# Patient Record
Sex: MALE | Race: WHITE | NOT HISPANIC OR LATINO | Employment: OTHER | URBAN - METROPOLITAN AREA
[De-identification: names, ages, dates, MRNs, and addresses within clinical notes are randomized per-mention and may not be internally consistent; named-entity substitution may affect disease eponyms.]

---

## 2017-06-30 ENCOUNTER — OFFICE VISIT (OUTPATIENT)
Dept: FAMILY MEDICINE | Facility: CLINIC | Age: 42
End: 2017-06-30
Payer: COMMERCIAL

## 2017-06-30 VITALS
HEART RATE: 89 BPM | WEIGHT: 191.4 LBS | BODY MASS INDEX: 27.4 KG/M2 | OXYGEN SATURATION: 99 % | SYSTOLIC BLOOD PRESSURE: 133 MMHG | HEIGHT: 70 IN | DIASTOLIC BLOOD PRESSURE: 78 MMHG

## 2017-06-30 DIAGNOSIS — Z11.3 SCREEN FOR STD (SEXUALLY TRANSMITTED DISEASE): ICD-10-CM

## 2017-06-30 DIAGNOSIS — B35.1 NAIL FUNGUS: Primary | ICD-10-CM

## 2017-06-30 LAB
ALBUMIN SERPL-MCNC: 4.1 G/DL (ref 3.4–5)
ALP SERPL-CCNC: 97 U/L (ref 40–150)
ALT SERPL W P-5'-P-CCNC: 49 U/L (ref 0–70)
ANION GAP SERPL CALCULATED.3IONS-SCNC: 6 MMOL/L (ref 3–14)
AST SERPL W P-5'-P-CCNC: 21 U/L (ref 0–45)
BILIRUB SERPL-MCNC: 0.5 MG/DL (ref 0.2–1.3)
BUN SERPL-MCNC: 19 MG/DL (ref 7–30)
CALCIUM SERPL-MCNC: 9.2 MG/DL (ref 8.5–10.1)
CHLORIDE SERPL-SCNC: 105 MMOL/L (ref 94–109)
CO2 SERPL-SCNC: 30 MMOL/L (ref 20–32)
CREAT SERPL-MCNC: 1.13 MG/DL (ref 0.66–1.25)
GFR SERPL CREATININE-BSD FRML MDRD: 71 ML/MIN/1.7M2
GLUCOSE SERPL-MCNC: 78 MG/DL (ref 70–99)
HCV AB SERPL QL IA: NORMAL
HIV 1+2 AB+HIV1 P24 AG SERPL QL IA: NORMAL
POTASSIUM SERPL-SCNC: 4.5 MMOL/L (ref 3.4–5.3)
PROT SERPL-MCNC: 7.7 G/DL (ref 6.8–8.8)
SODIUM SERPL-SCNC: 141 MMOL/L (ref 133–144)

## 2017-06-30 PROCEDURE — 86780 TREPONEMA PALLIDUM: CPT | Performed by: PHYSICIAN ASSISTANT

## 2017-06-30 PROCEDURE — 87591 N.GONORRHOEAE DNA AMP PROB: CPT | Performed by: PHYSICIAN ASSISTANT

## 2017-06-30 PROCEDURE — 36415 COLL VENOUS BLD VENIPUNCTURE: CPT | Performed by: PHYSICIAN ASSISTANT

## 2017-06-30 PROCEDURE — 80053 COMPREHEN METABOLIC PANEL: CPT | Performed by: PHYSICIAN ASSISTANT

## 2017-06-30 PROCEDURE — 87389 HIV-1 AG W/HIV-1&-2 AB AG IA: CPT | Performed by: PHYSICIAN ASSISTANT

## 2017-06-30 PROCEDURE — 87491 CHLMYD TRACH DNA AMP PROBE: CPT | Performed by: PHYSICIAN ASSISTANT

## 2017-06-30 PROCEDURE — 99213 OFFICE O/P EST LOW 20 MIN: CPT | Performed by: PHYSICIAN ASSISTANT

## 2017-06-30 PROCEDURE — 86803 HEPATITIS C AB TEST: CPT | Performed by: PHYSICIAN ASSISTANT

## 2017-06-30 RX ORDER — FLUCONAZOLE 200 MG/1
200 TABLET ORAL
Qty: 30 TABLET | Refills: 1 | Status: SHIPPED | OUTPATIENT
Start: 2017-07-03 | End: 2018-11-29

## 2017-06-30 NOTE — PROGRESS NOTES
"SUBJECTIVE:                                                    Bill Wadsworth is a 42 year old male who presents to clinic today for the following health issues:    Toenail problem       Duration: 6 months    Description (location/character/radiation): left great toe - fungus under nail    Intensity:  mild    Accompanying signs and symptoms:     History (similar episodes/previous evaluation): had similar thing in the past     Precipitating or alleviating factors: None    Therapies tried and outcome: was prescribed Fluconazole in the past which worked     STD screening - no known exposure, no current sx's - 50+ female partners. History of chlamydia age 19.    Problem list and histories reviewed & adjusted, as indicated.  Additional history: as documented    Patient Active Problem List   Diagnosis     CARDIOVASCULAR SCREENING; LDL GOAL LESS THAN 160     Impingement syndrome of right shoulder     Pectoralis muscle strain     Fear of flying     Past Surgical History:   Procedure Laterality Date     APPENDECTOMY OPEN  2004     surgery      cheek bone surgery       Social History   Substance Use Topics     Smoking status: Never Smoker     Smokeless tobacco: Never Used     Alcohol use Yes      Comment: 4-5 drinks per wk     Family History   Problem Relation Age of Onset     Hypertension Mother      CANCER Paternal Grandmother      Cancer - colorectal Paternal Grandfather          Current Outpatient Prescriptions   Medication Sig Dispense Refill     [START ON 7/3/2017] fluconazole (DIFLUCAN) 200 MG tablet Take 1 tablet (200 mg) by mouth twice a week 30 tablet 1     No Known Allergies    ROS:  Constitutional, HEENT, cardiovascular, pulmonary, gi and gu systems are negative, except as otherwise noted.    OBJECTIVE:     /78  Pulse 89  Ht 5' 10\" (1.778 m)  Wt 191 lb 6.4 oz (86.8 kg)  SpO2 99%  BMI 27.46 kg/m2  Body mass index is 27.46 kg/(m^2).  GENERAL: healthy, alert and no distress  Left great toe nail is " thickened and yellow.     Diagnostic Test Results:  No results found for this or any previous visit (from the past 24 hour(s)).    ASSESSMENT/PLAN:         ICD-10-CM    1. Nail fungus B35.1 fluconazole (DIFLUCAN) 200 MG tablet     Comprehensive metabolic panel     Comprehensive metabolic panel   2. Screen for STD (sexually transmitted disease) Z11.3 Anti Treponema     Chlamydia trachomatis PCR     Neisseria gonorrhoeae PCR     HIV Antigen Antibody Combo     Hepatitis C antibody   labs pending  Start diflucan. warning signs discussed. side effects discussed  Recheck labs in 4 wks.   Follow up  As needed .    Arun Dasilva PA-C  [facility]

## 2017-06-30 NOTE — NURSING NOTE
"Chief Complaint   Patient presents with     Fungal Infection     STD     screening       Initial /78  Pulse 89  Ht 5' 10\" (1.778 m)  Wt 191 lb 6.4 oz (86.8 kg)  SpO2 99%  BMI 27.46 kg/m2 Estimated body mass index is 27.46 kg/(m^2) as calculated from the following:    Height as of this encounter: 5' 10\" (1.778 m).    Weight as of this encounter: 191 lb 6.4 oz (86.8 kg).  Medication Reconciliation: complete  Rachelle Weir CMA    "

## 2017-06-30 NOTE — MR AVS SNAPSHOT
After Visit Summary   6/30/2017    Bill Wadsworth    MRN: [id number]           Patient Information     Date Of Birth          1975        Visit Information        Provider Department      6/30/2017 9:40 AM Arun Dasilva PA-C [facility]        Today's Diagnoses     Nail fungus    -  1    Screen for STD (sexually transmitted disease)           Follow-ups after your visit        Future tests that were ordered for you today     Open Future Orders        Priority Expected Expires Ordered    Comprehensive metabolic panel Routine  1/1/2018 6/30/2017            Who to contact     If you have questions or need follow up information about today's clinic visit or your schedule please contact [facility] directly at [phone].  Normal or non-critical lab and imaging results will be communicated to you by Advanced Photonixhart, letter or phone within 4 business days after the clinic has received the results. If you do not hear from us within 7 days, please contact the clinic through Advanced Photonixhart or phone. If you have a critical or abnormal lab result, we will notify you by phone as soon as possible.  Submit refill requests through [employer] or call your pharmacy and they will forward the refill request to us. Please allow 3 business days for your refill to be completed.          Additional Information About Your Visit        MyChart Information     [employer] gives you secure access to your electronic health record. If you see a primary care provider, you can also send messages to your care team and make appointments. If you have questions, please call your primary care clinic.  If you do not have a primary care provider, please call [phone] and they will assist you.        Care EveryWhere ID     This is your Care EveryWhere ID. This could be used by other organizations to access your [place] medical records  [id number]        Your Vitals Were     Pulse Height Pulse Oximetry BMI (Body  "Mass Index)          89 5' 10\" (1.778 m) 99% 27.46 kg/m2         Blood Pressure from Last 3 Encounters:   06/30/17 133/78   09/29/15 130/87   09/29/15 130/87    Weight from Last 3 Encounters:   06/30/17 191 lb 6.4 oz (86.8 kg)   09/29/15 196 lb (88.9 kg)   09/29/15 196 lb (88.9 kg)              We Performed the Following     Anti Treponema     Chlamydia trachomatis PCR     Comprehensive metabolic panel     Hepatitis C antibody     HIV Antigen Antibody Combo     Neisseria gonorrhoeae PCR          Today's Medication Changes          These changes are accurate as of: 6/30/17  9:54 AM.  If you have any questions, ask your nurse or doctor.               Start taking these medicines.        Dose/Directions    fluconazole 200 MG tablet   Commonly known as:  DIFLUCAN   Used for:  Nail fungus   Started by:  Arun Dasilva PA-C        Dose:  200 mg   Start taking on:  7/3/2017   Take 1 tablet (200 mg) by mouth twice a week   Quantity:  30 tablet   Refills:  1            Where to get your medicines      These medications were sent to [address], [facility] [address]  [address] [address] [address]     Phone:  [phone]     fluconazole 200 MG tablet                Primary Care Provider Office Phone # Fax #    William Rosenberg [phone]       [facility] [address] [facility] [address]        Equal Access to Services     HILDA CASAS AH: Hadii hugo ku hadasho Soomaali, waaxda luqadaha, qaybta kaalmada adeegyada, waxay abril fowler. So [facility] [phone].    ATENCIÓN: Si habla español, tiene a humphries disposición servicios gratuitos de asistencia lingüística. Llame al [phone].    We comply with applicable federal civil rights laws and [place] laws. We do not discriminate on the basis of race, color, national origin, age, disability sex, sexual orientation or gender identity.            Thank you!     Thank " you for choosing [facility]  [facility]. Our goal is always to provide you with excellent care. Hearing back from our patients is one way we can continue to improve our services. Please take a few minutes to complete the written survey that you may receive in the mail after your visit with us. Thank you!             Your Updated Medication List - Protect others around you: Learn how to safely use, store and throw away your medicines at www.disposemymeds.org.          This list is accurate as of: 6/30/17  9:54 AM.  Always use your most recent med list.                   Brand Name Dispense Instructions for use Diagnosis    fluconazole 200 MG tablet   Start taking on:  7/3/2017    DIFLUCAN    30 tablet    Take 1 tablet (200 mg) by mouth twice a week    Nail fungus

## 2017-07-02 LAB
C TRACH DNA SPEC QL NAA+PROBE: NORMAL
N GONORRHOEA DNA SPEC QL NAA+PROBE: NORMAL
SPECIMEN SOURCE: NORMAL
SPECIMEN SOURCE: NORMAL
T PALLIDUM IGG+IGM SER QL: NEGATIVE

## 2017-07-25 DIAGNOSIS — B35.1 NAIL FUNGUS: ICD-10-CM

## 2017-07-25 LAB
ALBUMIN SERPL-MCNC: 4.2 G/DL (ref 3.4–5)
ALP SERPL-CCNC: 91 U/L (ref 40–150)
ALT SERPL W P-5'-P-CCNC: 44 U/L (ref 0–70)
ANION GAP SERPL CALCULATED.3IONS-SCNC: 6 MMOL/L (ref 3–14)
AST SERPL W P-5'-P-CCNC: 23 U/L (ref 0–45)
BILIRUB SERPL-MCNC: 0.4 MG/DL (ref 0.2–1.3)
BUN SERPL-MCNC: 16 MG/DL (ref 7–30)
CALCIUM SERPL-MCNC: 9.2 MG/DL (ref 8.5–10.1)
CHLORIDE SERPL-SCNC: 104 MMOL/L (ref 94–109)
CO2 SERPL-SCNC: 29 MMOL/L (ref 20–32)
CREAT SERPL-MCNC: 1.05 MG/DL (ref 0.66–1.25)
GFR SERPL CREATININE-BSD FRML MDRD: 78 ML/MIN/1.7M2
GLUCOSE SERPL-MCNC: 103 MG/DL (ref 70–99)
POTASSIUM SERPL-SCNC: 4.3 MMOL/L (ref 3.4–5.3)
PROT SERPL-MCNC: 8.1 G/DL (ref 6.8–8.8)
SODIUM SERPL-SCNC: 139 MMOL/L (ref 133–144)

## 2017-07-25 PROCEDURE — 36415 COLL VENOUS BLD VENIPUNCTURE: CPT | Performed by: PHYSICIAN ASSISTANT

## 2017-07-25 PROCEDURE — 80053 COMPREHEN METABOLIC PANEL: CPT | Performed by: PHYSICIAN ASSISTANT

## 2017-07-26 NOTE — PROGRESS NOTES
Mr. Wadsworth,    All of your labs were normal/  near normal for you.    Please contact the clinic if you have additional questions.  Thank you.    Sincerely,    Arun Dasilva PA-C

## 2017-11-02 ENCOUNTER — ALLIED HEALTH/NURSE VISIT (OUTPATIENT)
Dept: NURSING | Facility: CLINIC | Age: 42
End: 2017-11-02
Payer: COMMERCIAL

## 2017-11-02 DIAGNOSIS — Z23 NEED FOR PROPHYLACTIC VACCINATION AND INOCULATION AGAINST INFLUENZA: Primary | ICD-10-CM

## 2017-11-02 PROCEDURE — 90686 IIV4 VACC NO PRSV 0.5 ML IM: CPT

## 2017-11-02 PROCEDURE — 99207 ZZC NO CHARGE NURSE ONLY: CPT

## 2017-11-02 PROCEDURE — 90471 IMMUNIZATION ADMIN: CPT

## 2017-11-02 NOTE — MR AVS SNAPSHOT
After Visit Summary   11/2/2017    Bill Wadsworth    MRN: [id number]           Patient Information     Date Of Birth          1975        Visit Information        Provider Department      11/2/2017 2:00 PM SENA WILSON [facility]        Today's Diagnoses     Need for prophylactic vaccination and inoculation against influenza    -  1       Follow-ups after your visit        Who to contact     If you have questions or need follow up information about today's clinic visit or your schedule please contact [facility] directly at [phone].  Normal or non-critical lab and imaging results will be communicated to you by Turbulenzhart, letter or phone within 4 business days after the clinic has received the results. If you do not hear from us within 7 days, please contact the clinic through ClariPhy Communicationst or phone. If you have a critical or abnormal lab result, we will notify you by phone as soon as possible.  Submit refill requests through [employer] or call your pharmacy and they will forward the refill request to us. Please allow 3 business days for your refill to be completed.          Additional Information About Your Visit        MyChart Information     [employer] gives you secure access to your electronic health record. If you see a primary care provider, you can also send messages to your care team and make appointments. If you have questions, please call your primary care clinic.  If you do not have a primary care provider, please call [phone] and they will assist you.        Care EveryWhere ID     This is your Care EveryWhere ID. This could be used by other organizations to access your [place] medical records  [id number]         Blood Pressure from Last 3 Encounters:   06/30/17 133/78   09/29/15 130/87   09/29/15 130/87    Weight from Last 3 Encounters:   06/30/17 191 lb 6.4 oz (86.8 kg)   09/29/15 196 lb (88.9 kg)   09/29/15 196 lb (88.9 kg)              We Performed the  Following     FLU VAC, SPLIT VIRUS IM > 3 YO (QUADRIVALENT) [04286]     Vaccine Administration, Initial [[id number]]        Primary Care Provider Office Phone # Fax #    William Rosenberg [phone] [address] [facility] [address]        Equal Access to Services     THERON CASAS : Hadii aad ku hadasho Soomaali, waaxda luqadaha, qaybta kaalmada adeegyada, waxay sinaiin hayaan aderodney soloriofloresfabio samuel . So [facility] [phone].    ATENCIÓN: Si habla español, tiene a humphries disposición servicios gratuitos de asistencia lingüística. Llame al [phone].    We comply with applicable federal civil rights laws and [place] laws. We do not discriminate on the basis of race, color, national origin, age, disability, sex, sexual orientation, or gender identity.            Thank you!     Thank you for choosing [facility]  for your care. Our goal is always to provide you with excellent care. Hearing back from our patients is one way we can continue to improve our services. Please take a few minutes to complete the written survey that you may receive in the mail after your visit with us. Thank you!             Your Updated Medication List - Protect others around you: Learn how to safely use, store and throw away your medicines at www.disposemymeds.org.          This list is accurate as of: 11/2/17  3:34 PM.  Always use your most recent med list.                   Brand Name Dispense Instructions for use Diagnosis    fluconazole 200 MG tablet    DIFLUCAN    30 tablet    Take 1 tablet (200 mg) by mouth twice a week    Nail fungus

## 2017-11-02 NOTE — PROGRESS NOTES

## 2018-11-29 ENCOUNTER — OFFICE VISIT (OUTPATIENT)
Dept: FAMILY MEDICINE | Facility: CLINIC | Age: 43
End: 2018-11-29
Payer: COMMERCIAL

## 2018-11-29 VITALS
OXYGEN SATURATION: 97 % | HEART RATE: 97 BPM | TEMPERATURE: 97.3 F | SYSTOLIC BLOOD PRESSURE: 138 MMHG | WEIGHT: 197 LBS | RESPIRATION RATE: 16 BRPM | BODY MASS INDEX: 28.27 KG/M2 | DIASTOLIC BLOOD PRESSURE: 84 MMHG

## 2018-11-29 DIAGNOSIS — I49.9 IRREGULAR HEART BEAT: Primary | ICD-10-CM

## 2018-11-29 DIAGNOSIS — Z23 NEED FOR PROPHYLACTIC VACCINATION AND INOCULATION AGAINST INFLUENZA: ICD-10-CM

## 2018-11-29 LAB
ALBUMIN SERPL-MCNC: 4.3 G/DL (ref 3.4–5)
ALP SERPL-CCNC: 95 U/L (ref 40–150)
ALT SERPL W P-5'-P-CCNC: 86 U/L (ref 0–70)
ANION GAP SERPL CALCULATED.3IONS-SCNC: 9 MMOL/L (ref 3–14)
AST SERPL W P-5'-P-CCNC: 40 U/L (ref 0–45)
BASOPHILS # BLD AUTO: 0 10E9/L (ref 0–0.2)
BASOPHILS NFR BLD AUTO: 0.1 %
BILIRUB SERPL-MCNC: 0.4 MG/DL (ref 0.2–1.3)
BUN SERPL-MCNC: 16 MG/DL (ref 7–30)
CALCIUM SERPL-MCNC: 9 MG/DL (ref 8.5–10.1)
CHLORIDE SERPL-SCNC: 104 MMOL/L (ref 94–109)
CO2 SERPL-SCNC: 27 MMOL/L (ref 20–32)
CREAT SERPL-MCNC: 1.21 MG/DL (ref 0.66–1.25)
DIFFERENTIAL METHOD BLD: NORMAL
EOSINOPHIL # BLD AUTO: 0 10E9/L (ref 0–0.7)
EOSINOPHIL NFR BLD AUTO: 0.2 %
ERYTHROCYTE [DISTWIDTH] IN BLOOD BY AUTOMATED COUNT: 12.5 % (ref 10–15)
GFR SERPL CREATININE-BSD FRML MDRD: 66 ML/MIN/1.7M2
GLUCOSE SERPL-MCNC: 88 MG/DL (ref 70–99)
HCT VFR BLD AUTO: 45.3 % (ref 40–53)
HGB BLD-MCNC: 16 G/DL (ref 13.3–17.7)
LYMPHOCYTES # BLD AUTO: 1.7 10E9/L (ref 0.8–5.3)
LYMPHOCYTES NFR BLD AUTO: 20.8 %
MCH RBC QN AUTO: 30.4 PG (ref 26.5–33)
MCHC RBC AUTO-ENTMCNC: 35.3 G/DL (ref 31.5–36.5)
MCV RBC AUTO: 86 FL (ref 78–100)
MONOCYTES # BLD AUTO: 0.6 10E9/L (ref 0–1.3)
MONOCYTES NFR BLD AUTO: 6.9 %
NEUTROPHILS # BLD AUTO: 5.8 10E9/L (ref 1.6–8.3)
NEUTROPHILS NFR BLD AUTO: 72 %
PLATELET # BLD AUTO: 170 10E9/L (ref 150–450)
POTASSIUM SERPL-SCNC: 4 MMOL/L (ref 3.4–5.3)
PROT SERPL-MCNC: 8.1 G/DL (ref 6.8–8.8)
RBC # BLD AUTO: 5.27 10E12/L (ref 4.4–5.9)
SODIUM SERPL-SCNC: 140 MMOL/L (ref 133–144)
TSH SERPL DL<=0.005 MIU/L-ACNC: 1.73 MU/L (ref 0.4–4)
WBC # BLD AUTO: 8.1 10E9/L (ref 4–11)

## 2018-11-29 PROCEDURE — 99214 OFFICE O/P EST MOD 30 MIN: CPT | Mod: 25 | Performed by: PHYSICIAN ASSISTANT

## 2018-11-29 PROCEDURE — 90471 IMMUNIZATION ADMIN: CPT | Performed by: PHYSICIAN ASSISTANT

## 2018-11-29 PROCEDURE — 84443 ASSAY THYROID STIM HORMONE: CPT | Performed by: PHYSICIAN ASSISTANT

## 2018-11-29 PROCEDURE — 85025 COMPLETE CBC W/AUTO DIFF WBC: CPT | Performed by: PHYSICIAN ASSISTANT

## 2018-11-29 PROCEDURE — 80053 COMPREHEN METABOLIC PANEL: CPT | Performed by: PHYSICIAN ASSISTANT

## 2018-11-29 PROCEDURE — 90686 IIV4 VACC NO PRSV 0.5 ML IM: CPT | Performed by: PHYSICIAN ASSISTANT

## 2018-11-29 PROCEDURE — 93000 ELECTROCARDIOGRAM COMPLETE: CPT | Performed by: PHYSICIAN ASSISTANT

## 2018-11-29 PROCEDURE — 36415 COLL VENOUS BLD VENIPUNCTURE: CPT | Performed by: PHYSICIAN ASSISTANT

## 2018-11-29 NOTE — PROGRESS NOTES
"SUBJECTIVE:                                                    Bill Wadsworth is a 43 year old male who presents to clinic today for the following health issues:    Heart Issues       Duration: 2-3 years    Description (location/character/radiation): feels heart racing, skipping and has to cough  - was only happening occasionally - but happening more often     Intensity:  moderate    Accompanying signs and symptoms:     History (similar episodes/previous evaluation): had an EKG done in past which was normal     Precipitating or alleviating factors: None    Therapies tried and outcome: None   More frequent in the last 6 months. Last couple minutes and then goes away.   Feels like he need to cough when symptoms start. Symptoms now weekly .  No chest pain or short of breath, lightheadedness or dizziness.   Last couple years of \"heart burn\" at night. increase after spicy foods and laying down at night.   Tx; tums and helps.     Caffiene: varies daily to up to 3 pops, ETOH 2 times a week.     Problem list and histories reviewed & adjusted, as indicated.  Additional history: as documented    Patient Active Problem List   Diagnosis     CARDIOVASCULAR SCREENING; LDL GOAL LESS THAN 160     Impingement syndrome of right shoulder     Pectoralis muscle strain     Fear of flying     Past Surgical History:   Procedure Laterality Date     APPENDECTOMY OPEN  2004     surgery      cheek bone surgery       Social History   Substance Use Topics     Smoking status: Never Smoker     Smokeless tobacco: Never Used     Alcohol use Yes      Comment: 4-5 drinks per wk     Family History   Problem Relation Age of Onset     Hypertension Mother      Cancer Paternal Grandmother      Cancer - colorectal Paternal Grandfather          No current outpatient prescriptions on file.     No Known Allergies  BP Readings from Last 3 Encounters:   11/29/18 138/84   06/30/17 133/78   09/29/15 130/87    Wt Readings from Last 3 Encounters:   11/29/18 197 lb " (89.4 kg)   06/30/17 191 lb 6.4 oz (86.8 kg)   09/29/15 196 lb (88.9 kg)            Labs reviewed in EPIC    ROS:  Constitutional, HEENT, cardiovascular, pulmonary, gi and gu systems are negative, except as otherwise noted.    OBJECTIVE:     /84  Pulse 97  Temp 97.3  F (36.3  C) (Oral)  Resp 16  Wt 197 lb (89.4 kg)  SpO2 97%  BMI 28.27 kg/m2  Body mass index is 28.27 kg/(m^2).  GENERAL: healthy, alert and no distress  EYES: Eyes grossly normal to inspection, PERRL and conjunctivae and sclerae normal  HENT: ear canals and TM's normal, nose and mouth without ulcers or lesions  NECK: no adenopathy, no asymmetry, masses, or scars and thyroid normal to palpation  RESP: lungs clear to auscultation - no rales, rhonchi or wheezes  CV: regular rate and rhythm, normal S1 S2, no S3 or S4, no murmur, click or rub, no peripheral edema and peripheral pulses strong  ABDOMEN: soft, nontender, no hepatosplenomegaly, no masses and bowel sounds normal  NEURO: Normal strength and tone, mentation intact and speech normal  PSYCH: mentation appears normal, affect normal/bright    Diagnostic Test Results:  Results for orders placed or performed in visit on 11/29/18 (from the past 24 hour(s))   CBC with platelets differential   Result Value Ref Range    WBC 8.1 4.0 - 11.0 10e9/L    RBC Count 5.27 4.4 - 5.9 10e12/L    Hemoglobin 16.0 13.3 - 17.7 g/dL    Hematocrit 45.3 40.0 - 53.0 %    MCV 86 78 - 100 fl    MCH 30.4 26.5 - 33.0 pg    MCHC 35.3 31.5 - 36.5 g/dL    RDW 12.5 10.0 - 15.0 %    Platelet Count 170 150 - 450 10e9/L    % Neutrophils 72.0 %    % Lymphocytes 20.8 %    % Monocytes 6.9 %    % Eosinophils 0.2 %    % Basophils 0.1 %    Absolute Neutrophil 5.8 1.6 - 8.3 10e9/L    Absolute Lymphocytes 1.7 0.8 - 5.3 10e9/L    Absolute Monocytes 0.6 0.0 - 1.3 10e9/L    Absolute Eosinophils 0.0 0.0 - 0.7 10e9/L    Absolute Basophils 0.0 0.0 - 0.2 10e9/L    Diff Method Automated Method      EKG - NSR, similar to previous.      ASSESSMENT/PLAN:       ICD-10-CM    1. Irregular heart beat I49.9 EKG 12-lead complete w/read - Clinics     Comprehensive metabolic panel     CBC with platelets differential     TSH with free T4 reflex     Zio Patch Holter   2. Need for prophylactic vaccination and inoculation against influenza Z23 FLU VACCINE, SPLIT VIRUS, IM (QUADRIVALENT) [17303]- >3 YRS     Vaccine Administration, Initial [55203]   1. Labs pending. warning signs discussed. If increase symptoms go to ED.   Offered Zio but he is going to check with insurance.   Ok to start priolsec to try to RO GERD etiology.   Follow up  Dependant on results.     Arun Dasilva PA-C  [facility]      Injectable Influenza Immunization Documentation    1.  Is the person to be vaccinated sick today?   No    2. Does the person to be vaccinated have an allergy to a component   of the vaccine?   No  Egg Allergy Algorithm Link    3. Has the person to be vaccinated ever had a serious reaction   to influenza vaccine in the past?   No    4. Has the person to be vaccinated ever had Guillain-Barré syndrome?   No    Form completed by Rachelle Weir [facility]

## 2018-11-29 NOTE — NURSING NOTE
"Chief Complaint   Patient presents with     Heart Problem       Initial /84  Pulse 97  Temp 97.3  F (36.3  C) (Oral)  Resp 16  Wt 197 lb (89.4 kg)  SpO2 97%  BMI 28.27 kg/m2 Estimated body mass index is 28.27 kg/(m^2) as calculated from the following:    Height as of 6/30/17: 5' 10\" (1.778 m).    Weight as of this encounter: 197 lb (89.4 kg).  Medication Reconciliation: complete  Rachelle Weir CMA    "

## 2018-11-29 NOTE — MR AVS SNAPSHOT
After Visit Summary   11/29/2018    Bill Wadsworth    MRN: [id number]           Patient Information     Date Of Birth          1975        Visit Information        Provider Department      11/29/2018 3:30 PM Arun Dasilva PA-C [facility]        Today's Diagnoses     Irregular heart beat    -  1    Need for prophylactic vaccination and inoculation against influenza           Follow-ups after your visit        Follow-up notes from your care team     Return in about 4 weeks (around 12/27/2018) for recheck.      Future tests that were ordered for you today     Open Future Orders        Priority Expected Expires Ordered    Zio Patch Holter Routine  1/13/2019 11/29/2018            Who to contact     If you have questions or need follow up information about today's clinic visit or your schedule please contact [facility] directly at [phone].  Normal or non-critical lab and imaging results will be communicated to you by Velteohart, letter or phone within 4 business days after the clinic has received the results. If you do not hear from us within 7 days, please contact the clinic through Velteohart or phone. If you have a critical or abnormal lab result, we will notify you by phone as soon as possible.  Submit refill requests through [employer] or call your pharmacy and they will forward the refill request to us. Please allow 3 business days for your refill to be completed.          Additional Information About Your Visit        MyChart Information     [employer] gives you secure access to your electronic health record. If you see a primary care provider, you can also send messages to your care team and make appointments. If you have questions, please call your primary care clinic.  If you do not have a primary care provider, please call [phone] and they will assist you.        Care EveryWhere ID     This is your Care EveryWhere ID. This could be used by other organizations  to access your [place] medical records  [id number]        Your Vitals Were     Pulse Temperature Respirations Pulse Oximetry BMI (Body Mass Index)       97 97.3  F (36.3  C) (Oral) 16 97% 28.27 kg/m2        Blood Pressure from Last 3 Encounters:   11/29/18 138/84   06/30/17 133/78   09/29/15 130/87    Weight from Last 3 Encounters:   11/29/18 197 lb (89.4 kg)   06/30/17 191 lb 6.4 oz (86.8 kg)   09/29/15 196 lb (88.9 kg)              We Performed the Following     CBC with platelets differential     Comprehensive metabolic panel     EKG 12-lead complete w/read - Clinics     FLU VACCINE, SPLIT VIRUS, IM (QUADRIVALENT) [58184]- >3 YRS     TSH with free T4 reflex     Vaccine Administration, Initial [21083]        Primary Care Provider Office Phone # Fax #    William Rosenberg [phone] [address] [facility] [address]        Equal Access to Services     HILDA CASAS : Hadii aad ku hadasho Soomaali, waaxda luqadaha, qaybta kaalmada adeegyada, waxay sinaiin haybianca samuel . So [facility] [phone].    ATENCIÓN: Si habla español, tiene a humphries disposición servicios gratuitos de asistencia lingüística. Llame al [phone].    We comply with applicable federal civil rights laws and [place] laws. We do not discriminate on the basis of race, color, national origin, age, disability, sex, sexual orientation, or gender identity.            Thank you!     Thank you for choosing [facility]  for your care. Our goal is always to provide you with excellent care. Hearing back from our patients is one way we can continue to improve our services. Please take a few minutes to complete the written survey that you may receive in the mail after your visit with us. Thank you!             Your Updated Medication List - Protect others around you: Learn how to safely use, store and throw away your medicines at www.disposemymeds.org.      Notice  As of 11/29/2018  4:03 PM    You have not  been prescribed any medications.

## 2019-02-28 ENCOUNTER — TELEPHONE (OUTPATIENT)
Dept: FAMILY MEDICINE | Facility: CLINIC | Age: 44
End: 2019-02-28

## 2019-02-28 NOTE — TELEPHONE ENCOUNTER
See 11/29/18 Zio Patch Holter order.  Please call pt to assist with scheduling appointment for placement of Zio Patch Holter.    Rayna Lopez RN

## 2019-02-28 NOTE — TELEPHONE ENCOUNTER
Patient is calling stated that the last time he came into clinic team was going to put a heat monitor on him. But patient had to check with his insurance if it was going to be covered. Now patient wants to get heart monitor put in.   Please call to advise  Thank you

## 2019-03-04 ENCOUNTER — ANCILLARY PROCEDURE (OUTPATIENT)
Dept: CARDIOLOGY | Facility: CLINIC | Age: 44
End: 2019-03-04
Attending: PHYSICIAN ASSISTANT
Payer: COMMERCIAL

## 2019-03-04 ENCOUNTER — ALLIED HEALTH/NURSE VISIT (OUTPATIENT)
Dept: NURSING | Facility: CLINIC | Age: 44
End: 2019-03-04
Payer: COMMERCIAL

## 2019-03-04 DIAGNOSIS — I49.9 IRREGULAR HEART BEAT: ICD-10-CM

## 2019-03-04 DIAGNOSIS — I49.9 IRREGULAR HEART BEAT: Primary | ICD-10-CM

## 2019-03-04 PROCEDURE — 99207 ZZC NO CHARGE NURSE ONLY: CPT

## 2019-03-04 PROCEDURE — 0296T ZIO PATCH HOLTER ADULT PEDIATRIC GREATER THAN 48 HRS: CPT | Performed by: PHYSICIAN ASSISTANT

## 2019-03-22 ENCOUNTER — TELEPHONE (OUTPATIENT)
Dept: NURSING | Facility: CLINIC | Age: 44
End: 2019-03-22

## 2019-03-27 ENCOUNTER — TELEPHONE (OUTPATIENT)
Dept: FAMILY MEDICINE | Facility: CLINIC | Age: 44
End: 2019-03-27

## 2019-03-27 DIAGNOSIS — I49.9 IRREGULAR HEARTBEAT: Primary | ICD-10-CM

## 2019-03-27 NOTE — TELEPHONE ENCOUNTER
Arseno was ordered by Arun Dasilva. Placed results in your basket to review.Nikki Rivera MA/SCOT

## 2019-03-27 NOTE — TELEPHONE ENCOUNTER
Spoke to patient on phone about results.  Recommend follow up  With cardiology    Arun Dasilva PA-C

## 2019-04-08 ENCOUNTER — OFFICE VISIT (OUTPATIENT)
Dept: CARDIOLOGY | Facility: CLINIC | Age: 44
End: 2019-04-08
Payer: COMMERCIAL

## 2019-04-08 VITALS
WEIGHT: 193 LBS | DIASTOLIC BLOOD PRESSURE: 85 MMHG | OXYGEN SATURATION: 98 % | HEART RATE: 101 BPM | BODY MASS INDEX: 27.69 KG/M2 | SYSTOLIC BLOOD PRESSURE: 123 MMHG

## 2019-04-08 DIAGNOSIS — I48.0 PAF (PAROXYSMAL ATRIAL FIBRILLATION) (H): Primary | ICD-10-CM

## 2019-04-08 PROCEDURE — 99203 OFFICE O/P NEW LOW 30 MIN: CPT | Mod: GC | Performed by: INTERNAL MEDICINE

## 2019-04-08 NOTE — NURSING NOTE
"Chief Complaint   Patient presents with     Atrial Fib     NEW Dr. Dweitt recent zio results showing atrial fibrillation. Symptomatic. KH-Per patient daily chest discomfort,heart palpitations,lightheaded occationally.       Initial /85 (BP Location: Right arm, Patient Position: Sitting, Cuff Size: Adult Large)   Pulse 101   Wt 87.5 kg (193 lb)   SpO2 98%   BMI 27.69 kg/m   Estimated body mass index is 27.69 kg/m  as calculated from the following:    Height as of 6/30/17: 1.778 m (5' 10\").    Weight as of this encounter: 87.5 kg (193 lb)..  BP completed using cuff size: large    Jorge Durbin L.P.N.    "

## 2019-04-08 NOTE — PATIENT INSTRUCTIONS
Thank you for coming to the [facility] @ [facility]; please note the following instructions:    1. Dr. Ju Dewitt has ordered an echocardiogram (Heart Ultrasound).  We have scheduled your echocardiogram appointment at the  [facility] Department ([address] [place] [address]); please see following pages for appointment detail information.    Please arrive 15 minutes early to allow time for registration.  The Cardiology Nurse will contact you regarding the results (please see result notification details at bottom of summary).    Echocardiogram Instructions:  -Wear comfortable clothing  -Refrain from wearing perfumes or scented lotions      2.Dr. Ju Dewitt has requested you to follow up after heart ultrasound; please see following pages for appointment detail information.              If you have any questions regarding your visit please contact your care team:     Cardiology  Telephone Number   Kimberlee GIBSON, RN  Madeline HERNADEZ,RN  Suyapa MYERS, WALTER LOGAN, MA  Jorge LIVINGSTON, LPN   [phone]    *After hours: [phone]   For scheduling appts:     [phone] or    [phone] (select option 1)    *After hours: [phone]     For the Device Clinic (Pacemakers and ICD's)  RN's :  Gaby Boggs   During business hours: [phone]    *After business hours:  [phone] (select option 4)      Normal test result notifications will be released via [employer] or mailed within 7 business days.  All other test results, will be communicated via telephone once reviewed by your cardiologist.    If you need a medication refill please contact your pharmacy.  Please allow 3 business days for your refill to be completed.    As always, thank you for trusting us with your health care needs!

## 2019-04-08 NOTE — PROGRESS NOTES
"CARDIOLOGY CLINIC NOTE  04/08/2019    HPI:  45 yo M with hx of HTN with recent palpitations who was found to have symptomatic AFib on a recent Ziopatch. Pt reports intermittently for the past 2-3 years he has had palpitations and feeling of \"skipped beats.\" These episodes have increased in frequency over the last 6 months and now are occurring at least weekly. Pt reports he feels like he needs to cough when they occur. No associated chest pain, SOB, orthopnea, PND, weight change, or LE edema. Pt denies dizziness, lightheadedness, or syncope. Pt does report hx of heart burn with eating spicy foods that improves with TUMS. Pt does report drinking up to 3 caffeinated beverages per day and drinks ETOH on the weekends. Pt has never smoked. Pt reports he was diagnosed with HTN years prior and was on a medication, but stopped taking it after several months and has had stable BP off the medication since then. Pt denies dizziness, lightheadedness, chest pain, orthopnea, PND, or LE edema. Pt does report during episodes that last longer, he feels a little SOB and fatigued at times. 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    ROS:  A complete 10-point ROS was negative except as above.    PAST MEDICAL HISTORY:  Past Medical History:   Diagnosis Date     Hypertension goal BP (blood pressure) < 140/90      Pectoralis muscle rupture 7/2014    left       PAST SURGICAL HISTORY:  Past Surgical History:   Procedure Laterality Date     APPENDECTOMY OPEN  2004     surgery      cheek bone surgery       FAMILY HISTORY:  Family History   Problem Relation Age of Onset     Hypertension Mother      Cancer Paternal Grandmother      Cancer - colorectal Paternal Grandfather        SOCIAL HISTORY:  Social History     Socioeconomic History     Marital status:      Spouse name: Not on file     Number of children: Not on file     Years of education: Not on file     Highest education level: Not on file   Occupational " History     Not on file   Social Needs     Financial resource strain: Not on file     Food insecurity:     Worry: Not on file     Inability: Not on file     Transportation needs:     Medical: Not on file     Non-medical: Not on file   Tobacco Use     Smoking status: Never Smoker     Smokeless tobacco: Never Used   Substance and Sexual Activity     Alcohol use: Yes     Comment: 4-5 drinks per wk     Drug use: No     Sexual activity: Yes     Partners: Female     Birth control/protection: Pill   Lifestyle     Physical activity:     Days per week: Not on file     Minutes per session: Not on file     Stress: Not on file   Relationships     Social connections:     Talks on phone: Not on file     Gets together: Not on file     Attends Mandaen service: Not on file     Active member of club or organization: Not on file     Attends meetings of clubs or organizations: Not on file     Relationship status: Not on file     Intimate partner violence:     Fear of current or ex partner: Not on file     Emotionally abused: Not on file     Physically abused: Not on file     Forced sexual activity: Not on file   Other Topics Concern     Parent/sibling w/ CABG, MI or angioplasty before 65F 55M? Yes     Comment: mother      Service Yes     Comment: [id number] [employer]     Blood Transfusions No     Caffeine Concern No     Occupational Exposure No     Hobby Hazards No     Sleep Concern No     Stress Concern No     Weight Concern No     Special Diet No     Back Care Not Asked     Exercise Yes     Comment: Tle layer and general construction//Realtor     Bike Helmet Not Asked     Seat Belt Yes     Self-Exams Yes   Social History Narrative     Not on file       MEDICATIONS:  No current outpatient medications on file.     No current facility-administered medications for this visit.        ALLERGIES:  No Known Allergies    PHYSICAL EXAM:  /85 (BP Location: Right arm, Patient Position: Sitting, Cuff Size: Adult Large)   Pulse 101    Wt 87.5 kg (193 lb)   SpO2 98%   BMI 27.69 kg/m    Gen: alert, interactive, NAD  HEENT: atraumatic, EOMI, MMM  Neck: supple, no JVD  CV: Tachy rate, RR, no m/r/g  Chest: CTAB, no wheezes or crackles  Abd: soft, NT, ND, +BS  Ext: no LE edema, 2+ peripheral pulses  Skin: warm and dry, no rashes on exposed surfaces  Neuro: alert and oriented, no focal deficits    LABS:    CMP  Last Comprehensive Metabolic Panel:  Sodium   Date Value Ref Range Status   11/29/2018 140 133 - 144 mmol/L Final     Potassium   Date Value Ref Range Status   11/29/2018 4.0 3.4 - 5.3 mmol/L Final     Chloride   Date Value Ref Range Status   11/29/2018 104 94 - 109 mmol/L Final     Carbon Dioxide   Date Value Ref Range Status   11/29/2018 27 20 - 32 mmol/L Final     Anion Gap   Date Value Ref Range Status   11/29/2018 9 3 - 14 mmol/L Final     Glucose   Date Value Ref Range Status   11/29/2018 88 70 - 99 mg/dL Final     Comment:     Non Fasting     Urea Nitrogen   Date Value Ref Range Status   11/29/2018 16 7 - 30 mg/dL Final     Creatinine   Date Value Ref Range Status   11/29/2018 1.21 0.66 - 1.25 mg/dL Final     GFR Estimate   Date Value Ref Range Status   11/29/2018 66 >60 mL/min/1.7m2 Final     Comment:     Non  GFR Calc     Calcium   Date Value Ref Range Status   11/29/2018 9.0 8.5 - 10.1 mg/dL Final     Bilirubin Total   Date Value Ref Range Status   11/29/2018 0.4 0.2 - 1.3 mg/dL Final     Alkaline Phosphatase   Date Value Ref Range Status   11/29/2018 95 40 - 150 U/L Final     ALT   Date Value Ref Range Status   11/29/2018 86 (H) 0 - 70 U/L Final     AST   Date Value Ref Range Status   11/29/2018 40 0 - 45 U/L Final       CBC  CBC RESULTS:   Recent Labs   Lab Test 11/29/18  1608   WBC 8.1   RBC 5.27   HGB 16.0   HCT 45.3   MCV 86   MCH 30.4   MCHC 35.3   RDW 12.5        LIPIDS  Recent Labs   Lab Test 02/06/12  0811   CHOL 126   HDL 27*   LDL 75   TRIG 119   CHOLHDLRATIO 4.6       TSH  TSH   Date Value Ref Range  Status   11/29/2018 1.73 0.40 - 4.00 mU/L Final       CARDIAC DATA:  Personally reviewed recent data including Ziopatch results      ASSESSMENT/PLAN:  45 yo M with hx of HTN with recent palpitations who was found to have symptomatic pAFib on a recent Ziopatch.    # Symptomatic Paroxysmal AFib  [id number] score of 1. Recent Ziopatch showed 1% AFib burden that was symptomatic, last as long as 2 hours, 19 minutes with rates of 50s-170s. Multiple episodes were patient triggered.   - TTE to assess baseline function, structure, and valves  - Pt is not interested in starting any medications at this time, but would like to discuss catheter ablation again at his next appt  - No need for AC given low risk for stroke and higher risk for bleeding with his job currently    RTC: 2 weeks after echocardiogram is completed    Pt seen and discussed with staff attending Dr. Dewitt, who agrees with the above.    Michaela Isabel MD  Cardiology Fellow      I have seen, interviewed, and examined patient. I have reviewed the laboratory tests, imaging, and other investigations. I have reviewed the management plan with the patient. I discussed with the team and agree with the findings and plan in this resident/fellow/nurse practitioner's note. In addition, changes in the physical examination, assessment and plan have been incorporated into the note by myself, as to make it a single cohesive document.       Ju Dewitt MD, MS  Cardiology/Cardiac EP Attending Staff

## 2019-04-08 NOTE — LETTER
"4/8/2019      RE: Bill Wadsworth  [address]  [place] [address]       Dear Colleague,    Thank you for the opportunity to participate in the care of your patient, Bill Wadsworth, at the [facility] HEART AT [facility]. Please see a copy of my visit note below.    CARDIOLOGY CLINIC NOTE  04/08/2019    HPI:  45 yo M with hx of HTN with recent palpitations who was found to have symptomatic AFib on a recent Ziopatch. Pt reports intermittently for the past 2-3 years he has had palpitations and feeling of \"skipped beats.\" These episodes have increased in frequency over the last 6 months and now are occurring at least weekly. Pt reports he feels like he needs to cough when they occur. No associated chest pain, SOB, orthopnea, PND, weight change, or LE edema. Pt denies dizziness, lightheadedness, or syncope. Pt does report hx of heart burn with eating spicy foods that improves with TUMS. Pt does report drinking up to 3 caffeinated beverages per day and drinks ETOH on the weekends. Pt has never smoked. Pt reports he was diagnosed with HTN years prior and was on a medication, but stopped taking it after several months and has had stable BP off the medication since then. Pt denies dizziness, lightheadedness, chest pain, orthopnea, PND, or LE edema. Pt does report during episodes that last longer, he feels a little SOB and fatigued at times. 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    ROS:  A complete 10-point ROS was negative except as above.    PAST MEDICAL HISTORY:  Past Medical History:   Diagnosis Date     Hypertension goal BP (blood pressure) < 140/90      Pectoralis muscle rupture 7/2014    left       PAST SURGICAL HISTORY:  Past Surgical History:   Procedure Laterality Date     APPENDECTOMY OPEN  2004     surgery      cheek bone surgery       FAMILY HISTORY:  Family History   Problem Relation Age of " Onset     Hypertension Mother      Cancer Paternal Grandmother      Cancer - colorectal Paternal Grandfather        SOCIAL HISTORY:  Social History     Socioeconomic History     Marital status:      Spouse name: Not on file     Number of children: Not on file     Years of education: Not on file     Highest education level: Not on file   Occupational History     Not on file   Social Needs     Financial resource strain: Not on file     Food insecurity:     Worry: Not on file     Inability: Not on file     Transportation needs:     Medical: Not on file     Non-medical: Not on file   Tobacco Use     Smoking status: Never Smoker     Smokeless tobacco: Never Used   Substance and Sexual Activity     Alcohol use: Yes     Comment: 4-5 drinks per wk     Drug use: No     Sexual activity: Yes     Partners: Female     Birth control/protection: Pill   Lifestyle     Physical activity:     Days per week: Not on file     Minutes per session: Not on file     Stress: Not on file   Relationships     Social connections:     Talks on phone: Not on file     Gets together: Not on file     Attends Jain service: Not on file     Active member of club or organization: Not on file     Attends meetings of clubs or organizations: Not on file     Relationship status: Not on file     Intimate partner violence:     Fear of current or ex partner: Not on file     Emotionally abused: Not on file     Physically abused: Not on file     Forced sexual activity: Not on file   Other Topics Concern     Parent/sibling w/ CABG, MI or angioplasty before 65F 55M? 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     No current facility-administered medications for this visit.        ALLERGIES:  No Known Allergies    PHYSICAL EXAM:  /85 (BP Location: Right arm, Patient Position: Sitting, Cuff Size: Adult Large)   Pulse 101   Wt 87.5 kg (193 lb)   SpO2 98%   BMI 27.69 kg/m     Gen: alert, interactive, NAD  HEENT: atraumatic, EOMI, MMM  Neck: supple, no JVD  CV: Tachy rate, RR, no m/r/g  Chest: CTAB, no wheezes or crackles  Abd: soft, NT, ND, +BS  Ext: no LE edema, 2+ peripheral pulses  Skin: warm and dry, no rashes on exposed surfaces  Neuro: alert and oriented, no focal deficits    LABS:    CMP  Last Comprehensive Metabolic Panel:  Sodium   Date Value Ref Range Status   11/29/2018 140 133 - 144 mmol/L Final     Potassium   Date Value Ref Range Status   11/29/2018 4.0 3.4 - 5.3 mmol/L Final     Chloride   Date Value Ref Range Status   11/29/2018 104 94 - 109 mmol/L Final     Carbon Dioxide   Date Value Ref Range Status   11/29/2018 27 20 - 32 mmol/L Final     Anion Gap   Date Value Ref Range Status   11/29/2018 9 3 - 14 mmol/L Final     Glucose   Date Value Ref Range Status   11/29/2018 88 70 - 99 mg/dL Final     Comment:     Non Fasting     Urea Nitrogen   Date Value Ref Range Status   11/29/2018 16 7 - 30 mg/dL Final     Creatinine   Date Value Ref Range Status   11/29/2018 1.21 0.66 - 1.25 mg/dL Final     GFR Estimate   Date Value Ref Range Status   11/29/2018 66 >60 mL/min/1.7m2 Final     Comment:     Non  GFR Calc     Calcium   Date Value Ref Range Status   11/29/2018 9.0 8.5 - 10.1 mg/dL Final     Bilirubin Total   Date Value Ref Range Status   11/29/2018 0.4 0.2 - 1.3 mg/dL Final     Alkaline Phosphatase   Date Value Ref Range Status   11/29/2018 95 40 - 150 U/L Final     ALT   Date Value Ref Range Status   11/29/2018 86 (H) 0 - 70 U/L Final     AST   Date Value  Ref Range Status   11/29/2018 40 0 - 45 U/L Final       CBC  CBC RESULTS:   Recent Labs   Lab Test 11/29/18  1608   WBC 8.1   RBC 5.27   HGB 16.0   HCT 45.3   MCV 86   MCH 30.4   MCHC 35.3   RDW 12.5        LIPIDS  Recent Labs   Lab Test 02/06/12  0811   CHOL 126   HDL 27*   LDL 75   TRIG 119   CHOLHDLRATIO 4.6       TSH  TSH   Date Value Ref Range Status   11/29/2018 1.73 0.40 - 4.00 mU/L Final       CARDIAC DATA:  Personally reviewed recent data including Ziopatch results      ASSESSMENT/PLAN:  43 yo M with hx of HTN with recent palpitations who was found to have symptomatic pAFib on a recent Ziopatch.    # Symptomatic Paroxysmal AFib  [id number] score of 1. Recent Ziopatch showed 1% AFib burden that was symptomatic, last as long as 2 hours, 19 minutes with rates of 50s-170s. Multiple episodes were patient triggered.   - TTE to assess baseline function, structure, and valves  - Pt is not interested in starting any medications at this time, but would like to discuss catheter ablation again at his next appt  - No need for AC given low risk for stroke and higher risk for bleeding with his job currently    RTC: 2 weeks after echocardiogram is completed    Pt seen and discussed with staff attending Dr. Dewitt, who agrees with the above.    Michaela Isabel MD  Cardiology Fellow      I have seen, interviewed, and examined patient. I have reviewed the laboratory tests, imaging, and other investigations. I have reviewed the management plan with the patient. I discussed with the team and agree with the findings and plan in this resident/fellow/nurse practitioner's note. In addition, changes in the physical examination, assessment and plan have been incorporated into the note by myself, as to make it a single cohesive document.       Ju Dewitt MD, MS  Cardiology/Cardiac EP Attending Staff

## 2019-04-12 ENCOUNTER — ANCILLARY PROCEDURE (OUTPATIENT)
Dept: CARDIOLOGY | Facility: CLINIC | Age: 44
End: 2019-04-12
Attending: INTERNAL MEDICINE
Payer: COMMERCIAL

## 2019-04-12 DIAGNOSIS — I48.0 PAF (PAROXYSMAL ATRIAL FIBRILLATION) (H): ICD-10-CM

## 2019-04-12 PROCEDURE — 93306 TTE W/DOPPLER COMPLETE: CPT | Performed by: INTERNAL MEDICINE

## 2019-04-22 ENCOUNTER — OFFICE VISIT (OUTPATIENT)
Dept: CARDIOLOGY | Facility: CLINIC | Age: 44
End: 2019-04-22
Payer: COMMERCIAL

## 2019-04-22 VITALS
SYSTOLIC BLOOD PRESSURE: 129 MMHG | HEART RATE: 60 BPM | WEIGHT: 193 LBS | BODY MASS INDEX: 27.69 KG/M2 | OXYGEN SATURATION: 97 % | DIASTOLIC BLOOD PRESSURE: 87 MMHG

## 2019-04-22 DIAGNOSIS — I48.0 PAROXYSMAL ATRIAL FIBRILLATION (H): Primary | ICD-10-CM

## 2019-04-22 PROCEDURE — 99214 OFFICE O/P EST MOD 30 MIN: CPT | Mod: GC | Performed by: INTERNAL MEDICINE

## 2019-04-22 NOTE — NURSING NOTE
"No chief complaint on file.      Initial /87 (BP Location: Right arm, Patient Position: Sitting, Cuff Size: Adult Large)   Pulse 60   Wt 87.5 kg (193 lb)   SpO2 97%   BMI 27.69 kg/m   Estimated body mass index is 27.69 kg/m  as calculated from the following:    Height as of 6/30/17: 1.778 m (5' 10\").    Weight as of this encounter: 87.5 kg (193 lb)..  BP completed using cuff size: eva Durbin L.P.N.      "

## 2019-04-22 NOTE — PROGRESS NOTES
"CARDIOLOGY CLINIC NOTE  04/22/2019    HPI:  45 yo M with hx of HTN with recent palpitations who was found to have symptomatic AFib on a recent Ziopatch. Pt reports intermittently for the past 2-3 years he has had palpitations and feeling of \"skipped beats.\" These episodes have increased in frequency over the last 6 months and now are occurring at least weekly. Pt reports he feels like he needs to cough when they occur. No associated chest pain, SOB, orthopnea, PND, weight change, or LE edema. Pt denies dizziness, lightheadedness, or syncope. Pt does report hx of heart burn with eating spicy foods that improves with TUMS. Pt does report drinking up to 3 caffeinated beverages per day and drinks ETOH on the weekends. Pt has never smoked. Pt reports he was diagnosed with HTN years prior and was on a medication, but stopped taking it after several months and has had stable BP off the medication since then. Pt denies dizziness, lightheadedness, chest pain, orthopnea, PND, or LE edema. Pt does report during episodes that last longer, he feels a little SOB and fatigued at times. Pt works with his hands and is always getting cuts and scrapes. Patient returns for f/u after taking the last couple weeks to think about what he would like to do in regards to management. Pt reports he has though a lot about this and has decided to proceed with an ablation.    ROS:  A complete 10-point ROS was negative except as above.    PAST MEDICAL HISTORY:  Past Medical History:   Diagnosis Date     Hypertension goal BP (blood pressure) < 140/90      Pectoralis muscle rupture 7/2014    left       PAST SURGICAL HISTORY:  Past Surgical History:   Procedure Laterality Date     APPENDECTOMY OPEN  2004     surgery      cheek bone surgery       FAMILY HISTORY:  Family History   Problem Relation Age of Onset     Hypertension Mother      Cancer Paternal Grandmother      Cancer - colorectal Paternal Grandfather        SOCIAL HISTORY:  Social History "     Socioeconomic History     Marital status:      Spouse name: Not on file     Number of children: Not on file     Years of education: Not on file     Highest education level: Not on file   Occupational History     Not on file   Social Needs     Financial resource strain: Not on file     Food insecurity:     Worry: Not on file     Inability: Not on file     Transportation needs:     Medical: Not on file     Non-medical: Not on file   Tobacco Use     Smoking status: Never Smoker     Smokeless tobacco: Never Used   Substance and Sexual Activity     Alcohol use: Yes     Comment: 4-5 drinks per wk     Drug use: No     Sexual activity: Yes     Partners: Female     Birth control/protection: Pill   Lifestyle     Physical activity:     Days per week: Not on file     Minutes per session: Not on file     Stress: Not on file   Relationships     Social connections:     Talks on phone: Not on file     Gets together: Not on file     Attends Ep[employer] service: Not on file     Active member of club or organization: Not on file     Attends meetings of clubs or organizations: Not on file     Relationship status: Not on file     Intimate partner violence:     Fear of current or ex partner: Not on file     Emotionally abused: Not on file     Physically abused: Not on file     Forced sexual activity: Not on file   Other Topics Concern     Parent/sibling w/ CABG, MI or angioplasty before 65F 55M? 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     No  current facility-administered medications for this visit.        ALLERGIES:  No Known Allergies    PHYSICAL EXAM:  /87 (BP Location: Right arm, Patient Position: Sitting, Cuff Size: Adult Large)   Pulse 60   Wt 87.5 kg (193 lb)   SpO2 97%   BMI 27.69 kg/m    Gen: alert, interactive, NAD  HEENT: atraumatic, EOMI, MMM  Neck: supple, no JVD  CV: RRR, no m/r/g  Chest: CTAB, no wheezes or crackles  Abd: soft, NT, ND, +BS  Ext: no LE edema, 2+ peripheral pulses  Skin: warm and dry, no rashes on exposed surfaces  Neuro: alert and oriented, no focal deficits    LABS:    CMP  Last Comprehensive Metabolic Panel:  Sodium   Date Value Ref Range Status   11/29/2018 140 133 - 144 mmol/L Final     Potassium   Date Value Ref Range Status   11/29/2018 4.0 3.4 - 5.3 mmol/L Final     Chloride   Date Value Ref Range Status   11/29/2018 104 94 - 109 mmol/L Final     Carbon Dioxide   Date Value Ref Range Status   11/29/2018 27 20 - 32 mmol/L Final     Anion Gap   Date Value Ref Range Status   11/29/2018 9 3 - 14 mmol/L Final     Glucose   Date Value Ref Range Status   11/29/2018 88 70 - 99 mg/dL Final     Comment:     Non Fasting     Urea Nitrogen   Date Value Ref Range Status   11/29/2018 16 7 - 30 mg/dL Final     Creatinine   Date Value Ref Range Status   11/29/2018 1.21 0.66 - 1.25 mg/dL Final     GFR Estimate   Date Value Ref Range Status   11/29/2018 66 >60 mL/min/1.7m2 Final     Comment:     Non  GFR Calc     Calcium   Date Value Ref Range Status   11/29/2018 9.0 8.5 - 10.1 mg/dL Final     Bilirubin Total   Date Value Ref Range Status   11/29/2018 0.4 0.2 - 1.3 mg/dL Final     Alkaline Phosphatase   Date Value Ref Range Status   11/29/2018 95 40 - 150 U/L Final     ALT   Date Value Ref Range Status   11/29/2018 86 (H) 0 - 70 U/L Final     AST   Date Value Ref Range Status   11/29/2018 40 0 - 45 U/L Final       CBC  CBC RESULTS:   Recent Labs   Lab Test 11/29/18  1608   WBC 8.1   RBC 5.27   HGB 16.0   HCT  45.3   MCV 86   MCH 30.4   MCHC 35.3   RDW 12.5        LIPIDS  Recent Labs   Lab Test 02/06/12  0811   CHOL 126   HDL 27*   LDL 75   TRIG 119   CHOLHDLRATIO 4.6       TSH  TSH   Date Value Ref Range Status   11/29/2018 1.73 0.40 - 4.00 mU/L Final       CARDIAC DATA:  Personally reviewed recent data including Ziopatch results and TTE      ASSESSMENT/PLAN:  45 yo M with hx of HTN with recent palpitations who was found to have symptomatic pAFib on a recent Ziopatch.    # Symptomatic Paroxysmal AFib  [id number] score of 1. Recent Ziopatch showed 1% AFib burden that was symptomatic, last as long as 2 hours, 19 minutes with rates of 50s-170s. Multiple episodes were patient triggered. Recent TTE is grossly unremarkable with normal LVEF, valves, and only mild LA enlargement.  - Discussed with patient various options for management of symptomatic pAFib and Pt would like to proceed with the PVI ablation  - Explained to patient that risks of EP study and ablation procedure include, but are not limited to vascular injury, excessive bleeding requiring blood transfusion, aortic injury, cardiac tamponade requiring pericardiocentesis or open heart surgery, TIA, stroke, esophageal injury, pulmonary vein stenosis or death. Patient understood the risks and decided to proceed with procedure. Procedure will be scheduled  - Pt will start dabigatran 2 weeks prior to the procedure and for 3 month afterwards  - Plan for SONNY and Cardiac CTA day prior to the procedure (NPO night prior to these), and then NPO after MN for the procedure itself  - Educated Pt about work restrictions following the procedure (usually return to work within one week, sooner if feeling well after the procedure) and to avoid high-risk bleeding activities while on anticoagulation    RTC: 1 month following the ablation with Michaela Ann    Pt seen and discussed with staff attending Dr. Dewitt, who agrees with the above.    Michaela Isabel MD  Cardiology  Fellow      I have seen, interviewed, and examined patient. I have reviewed the laboratory tests, imaging, and other investigations. I have reviewed the management plan with the patient. I discussed with the team and agree with the findings and plan in this resident/fellow/nurse practitioner's note. In addition, changes in the physical examination, assessment and plan have been incorporated into the note by myself, as to make it a single cohesive document.       Ju Dewitt MD, MS  Cardiology/Cardiac EP Attending Staff

## 2019-04-22 NOTE — LETTER
"4/22/2019      RE: Bill Wadsworth  [address] [place] [address]  [place] [address]       Dear Colleague,    Thank you for the opportunity to participate in the care of your patient, Bill Wadsworth, at the [facility] AT [facility]. Please see a copy of my visit note below.    CARDIOLOGY CLINIC NOTE  04/22/2019    HPI:  45 yo M with hx of HTN with recent palpitations who was found to have symptomatic AFib on a recent Ziopatch. Pt reports intermittently for the past 2-3 years he has had palpitations and feeling of \"skipped beats.\" These episodes have increased in frequency over the last 6 months and now are occurring at least weekly. Pt reports he feels like he needs to cough when they occur. No associated chest pain, SOB, orthopnea, PND, weight change, or LE edema. Pt denies dizziness, lightheadedness, or syncope. Pt does report hx of heart burn with eating spicy foods that improves with TUMS. Pt does report drinking up to 3 caffeinated beverages per day and drinks ETOH on the weekends. Pt has never smoked. Pt reports he was diagnosed with HTN years prior and was on a medication, but stopped taking it after several months and has had stable BP off the medication since then. Pt denies dizziness, lightheadedness, chest pain, orthopnea, PND, or LE edema. Pt does report during episodes that last longer, he feels a little SOB and fatigued at times. Pt works with his hands and is always getting cuts and scrapes. Patient returns for f/u after taking the last couple weeks to think about what he would like to do in regards to management. Pt reports he has though a lot about this and has decided to proceed with an ablation.    ROS:  A complete 10-point ROS was negative except as above.    PAST MEDICAL HISTORY:  Past Medical History:   Diagnosis Date     Hypertension goal BP (blood pressure) < 140/90      Pectoralis muscle rupture 7/2014    left "       PAST SURGICAL HISTORY:  Past Surgical History:   Procedure Laterality Date     APPENDECTOMY OPEN  2004     surgery      cheek bone surgery       FAMILY HISTORY:  Family History   Problem Relation Age of Onset     Hypertension Mother      Cancer Paternal Grandmother      Cancer - colorectal Paternal Grandfather        SOCIAL HISTORY:  Social History     Socioeconomic History     Marital status:      Spouse name: Not on file     Number of children: Not on file     Years of education: Not on file     Highest education level: Not on file   Occupational History     Not on file   Social Needs     Financial resource strain: Not on file     Food insecurity:     Worry: Not on file     Inability: Not on file     Transportation needs:     Medical: Not on file     Non-medical: Not on file   Tobacco Use     Smoking status: Never Smoker     Smokeless tobacco: Never Used   Substance and Sexual Activity     Alcohol use: Yes     Comment: 4-5 drinks per wk     Drug use: No     Sexual activity: Yes     Partners: Female     Birth control/protection: Pill   Lifestyle     Physical activity:     Days per week: Not on file     Minutes per session: Not on file     Stress: Not on file   Relationships     Social connections:     Talks on phone: Not on file     Gets together: Not on file     Attends Cheondoism service: Not on file     Active member of club or organization: Not on file     Attends meetings of clubs or organizations: Not on file     Relationship status: Not on file     Intimate partner violence:     Fear of current or ex partner: Not on file     Emotionally abused: Not on file     Physically abused: Not on file     Forced sexual activity: Not on file   Other Topics Concern     Parent/sibling w/ CABG, MI or angioplasty before 65F 55M? Yes     Comment: mother      Service Yes     Comment: [id number]     Blood Transfusions No     Caffeine Concern No     Occupational Exposure No     Hobby Hazards No      Sleep Concern No     Stress Concern No     Weight Concern No     Special Diet No     Back Care Not Asked     Exercise Yes     Comment: Tle layer and general construction//Realtor     Bike Helmet Not Asked     Seat Belt Yes     Self-Exams Yes   Social History Narrative     Not on file       MEDICATIONS:  No current outpatient medications on file.     No current facility-administered medications for this visit.        ALLERGIES:  No Known Allergies    PHYSICAL EXAM:  /87 (BP Location: Right arm, Patient Position: Sitting, Cuff Size: Adult Large)   Pulse 60   Wt 87.5 kg (193 lb)   SpO2 97%   BMI 27.69 kg/m     Gen: alert, interactive, NAD  HEENT: atraumatic, EOMI, MMM  Neck: supple, no JVD  CV: RRR, no m/r/g  Chest: CTAB, no wheezes or crackles  Abd: soft, NT, ND, +BS  Ext: no LE edema, 2+ peripheral pulses  Skin: warm and dry, no rashes on exposed surfaces  Neuro: alert and oriented, no focal deficits    LABS:    CMP  Last Comprehensive Metabolic Panel:  Sodium   Date Value Ref Range Status   11/29/2018 140 133 - 144 mmol/L Final     Potassium   Date Value Ref Range Status   11/29/2018 4.0 3.4 - 5.3 mmol/L Final     Chloride   Date Value Ref Range Status   11/29/2018 104 94 - 109 mmol/L Final     Carbon Dioxide   Date Value Ref Range Status   11/29/2018 27 20 - 32 mmol/L Final     Anion Gap   Date Value Ref Range Status   11/29/2018 9 3 - 14 mmol/L Final     Glucose   Date Value Ref Range Status   11/29/2018 88 70 - 99 mg/dL Final     Comment:     Non Fasting     Urea Nitrogen   Date Value Ref Range Status   11/29/2018 16 7 - 30 mg/dL Final     Creatinine   Date Value Ref Range Status   11/29/2018 1.21 0.66 - 1.25 mg/dL Final     GFR Estimate   Date Value Ref Range Status   11/29/2018 66 >60 mL/min/1.7m2 Final     Comment:     Non  GFR Calc     Calcium   Date Value Ref Range Status   11/29/2018 9.0 8.5 - 10.1 mg/dL Final     Bilirubin Total   Date Value Ref Range Status   11/29/2018 0.4 0.2 -  1.3 mg/dL Final     Alkaline Phosphatase   Date Value Ref Range Status   11/29/2018 95 40 - 150 U/L Final     ALT   Date Value Ref Range Status   11/29/2018 86 (H) 0 - 70 U/L Final     AST   Date Value Ref Range Status   11/29/2018 40 0 - 45 U/L Final       CBC  CBC RESULTS:   Recent Labs   Lab Test 11/29/18  1608   WBC 8.1   RBC 5.27   HGB 16.0   HCT 45.3   MCV 86   MCH 30.4   MCHC 35.3   RDW 12.5        LIPIDS  Recent Labs   Lab Test 02/06/12  0811   CHOL 126   HDL 27*   LDL 75   TRIG 119   CHOLHDLRATIO 4.6       TSH  TSH   Date Value Ref Range Status   11/29/2018 1.73 0.40 - 4.00 mU/L Final       CARDIAC DATA:  Personally reviewed recent data including Ziopatch results and TTE      ASSESSMENT/PLAN:  45 yo M with hx of HTN with recent palpitations who was found to have symptomatic pAFib on a recent Ziopatch.    # Symptomatic Paroxysmal AFib  [id number] score of 1. Recent Ziopatch showed 1% AFib burden that was symptomatic, last as long as 2 hours, 19 minutes with rates of 50s-170s. Multiple episodes were patient triggered. Recent TTE is grossly unremarkable with normal LVEF, valves, and only mild LA enlargement.  - Discussed with patient various options for management of symptomatic pAFib and Pt would like to proceed with the PVI ablation  - Explained to patient that risks of EP study and ablation procedure include, but are not limited to vascular injury, excessive bleeding requiring blood transfusion, aortic injury, cardiac tamponade requiring pericardiocentesis or open heart surgery, TIA, stroke, esophageal injury, pulmonary vein stenosis or death. Patient understood the risks and decided to proceed with procedure. Procedure will be scheduled  - Pt will start dabigatran 2 weeks prior to the procedure and for 3 month afterwards  - Plan for SONNY and Cardiac CTA day prior to the procedure (NPO night prior to these), and then NPO after MN for the procedure itself  - Educated Pt about work restrictions  following the procedure (usually return to work within one week, sooner if feeling well after the procedure) and to avoid high-risk bleeding activities while on anticoagulation    RTC: 1 month following the ablation with Michaela Ann    Pt seen and discussed with staff attending Dr. Dewitt, who agrees with the above.    Michaela Isabel MD  Cardiology Fellow      I have seen, interviewed, and examined patient. I have reviewed the laboratory tests, imaging, and other investigations. I have reviewed the management plan with the patient. I discussed with the team and agree with the findings and plan in this resident/fellow/nurse practitioner's note. In addition, changes in the physical examination, assessment and plan have been incorporated into the note by myself, as to make it a single cohesive document.       Ju Dewitt MD, MS  Cardiology/Cardiac EP Attending Staff

## 2019-04-22 NOTE — PATIENT INSTRUCTIONS
Thank you for coming to the [facility] @ [place] [facility]; please note the following instructions:    1.   You are scheduled for an Atrial Fibrillation Ablation, at [facility], [place], with Dr. Dewitt. The hospital is located at [address] [place] [address] on the East bank of the campus.     Please disregard any automated, reminder phone calls regarding arrival times. Follow the below arrival times.     Pre-Anesthesia Phone Call will occur 1-2 days prior to procedure date.  You do not need to come to the [facility].      Date:   SONNY (transesophageal echocardiogram), CT Angiogram  _______: Arrive to the [facility] Waiting Room for your: CT & SONNY with INR check if on warfarin.      -Only if you take warfarin: If your INR is too high or too low, your procedure may be canceled, or you may be required to stay overnight in the hospital.    -If there is evidence of a clot in your heart on the SONNY,  the procedure will be canceled.     SONNY Instructions:  1. Nothing to eat or drink 6 hours prior.  2. No additional medication instructions.  3. You will receive sedation for the SONNY so you will need someone to drive you to and from the hospital and stay with you for 6 hours. Do not make any legal decisions for 24 hours. You may go home after this test.     CT Angiogram Instructions:  1. Nothing to eat or drink except water 3 hours prior.  2. Avoid caffeine, smoking, or strenuous activity on the day of the test.  3. You will receive contrast, so plan to drink extra water the day before and after your test.  4. If you take any of the following medications, please HOLD the day of:   * Metformin or Glucophage the morning of and wait 48 hours before re-starting   * Diuretic the day of. (Examples: Furosemide (Lasix), Torsemide, Bumetanide (Bumex), Metolazone (Zaroxolyn) and Hydrochlorothiazide, Spironolactone)   * NSAIDS (Examples: ibuprofen/Advil/Motrin, naproxen/Aleve) the day of.   * Erectile  dysfunction medications (Examples: Viagra, Cialis, Levitra).  5. Please allow 1 hour of time for preparation and testing. You are on the table for ~15 minutes. Your feet go in first, your head is not enclosed.      OR:  Cardiac MRI   1. You will be required to lay flat and follow breath-hold instructions.   2. You will need to remove all metal and answer a safety questionnaire.           Date:   Time: ________________to [address] at the [facility]  Atrial Fibrillation Ablation Procedure    1. Please review the attached instructions on showering before your procedure at the end of this letter.  2. Your history and physical will be completed by our nurse practitioner when you arrive.  3. Please do not eat anything for 8 hours prior to your procedure. You may have sips of water up until 2 hours prior to your arrival.  4. If you are diabetic follow the following instructions: (Contact your diabetes team if you have questions)   * Hold oral diabetic medications and short-acting insulins (aspart, lispro, regular) the morning of the procedure.   * Hold mixed insulins (70/30, 75/25, 50/50) the morning of procedure. Instead, take 66% of NPH (N) component.   * Take 80% of your normal long-acting insulin (glargine/Lantus or levemir/Detemir) the evening before and/or morning of the procedure.  5. The morning of your procedure, you may take your scheduled medications with a sip of water - continue your blood thinner (warfarin, Pradaxa). If you take Pradaxa, please take your AM dose before you come to the hospital.   6. You will receive general anesthesia for this procedure.   7. You will stay in the hospital overnight, and will need a .      Post-Procedure Instructions  Care of groin site:    Remove the Band-Aid after 24 hours. If there is minor oozing, apply another Band-aid and remove it after 12 hours.     Do NOT take a bath, use a hot tub, pool, or submerse in water for at least 3 days. You may shower.     It is  normal to have a small bruise or lump at the site.    Do not scrub the site.    Do not use lotion or powder near the puncture site for 3 days.      For the first 2 days: Do not stoop or squat. When you cough, sneeze or move your bowels, hold your hand over the puncture site and press gently.    Do not lift more than 10 pounds or exertional activity for 10 days.  - No driving for 24 hours after (with or without general anesthesia).     If you start bleeding from the site in your groin: Lie down flat and press firmly on the site. Call your physician immediately, or, come to the emergency room.    Call 911 right away if you have bleeding that is heavy or does not stop.  Call your doctor/provider if:     You have a large or growing hard lump around the site.     The site is red, swollen, hot or tender.     Blood or fluid is draining from the site.     You have chills or a fever greater than 101 F (38 C).     Your leg or arm turns bluish, feels numb or cool.     You have hives, a rash or unusual itching.      Date: _______ Follow up with EP Nurse Practitioner  Date: ______ 14 day Ziopatch hookup  Date: _______ Follow up with Dr. Dewitt.             Please do not hesitate to utilize MyChart or call us if you have any questions or concerns.    Eunice Le RN  Electrophysiology Nurse Coordinator  [phone]    [facility]   [phone]      Showering Before Surgery   Your surgeon has asked you to take 2 showers before surgery.  Why is this important?  It is normal for bacteria (germs) to be on your skin. The skin protects us from these germs. When you have surgery, we cut the skin. Sometimes germs get into the cuts and cause infection (illness caused by germs). By following the instructions below and using special soap, you will lower the number of germs on your skin. This decreases your chance of infection.  Special soap  Buy or get 8 ounces of antiseptic surgical soap called 4% CHG. Common name  brands of this soap are Hibiclens and Exidine.   You can find it at your local pharmacy, clinic or retail store. If you have trouble, ask your pharmacist to help you find the right substitute.   A note about shaving:  Do not shave within 12 inches of your incision (surgical cut) area for at least 3 days before surgery. Shaving can make small cuts in the skin. This puts you at a higher risk of infection.  Items you will need for each shower:    1 newly washed towel    4 ounces of one of the above soaps  Follow these instructions:  The evening before surgery   1. Wash your hair and body with your regular shampoo and soap. Make sure you rinse the shampoo and soap from your hair and body.   2. Using clean hands, apply about 2 ounces of soap gently on your skin from the neck to your toes. Use on your groin area last. Do not use this soap on your face or head. If you get any soap in your eyes, ears or mouth, rinse right away.   3. Repeat step 2. It is very important to let the soap stay on your skin for at least 1 minute.   4. Rinse well and dry off using a clean towel.If you feel any tingling, itching or other irritation, rinse right away. It is normal to feel some coolness on the skin after using the antiseptic soap. Your skin may feel a bit dry after the shower, but do not use any lotions, creams or moisturizers. Do not use hair spray or other products in your hair.  5. Dress in freshly washed clothes or pajamas. Use fresh pillowcases and sheets on your bed.    The morning of surgery  1. Wash your hair and body with your regular shampoo and soap. Make sure you rinse the shampoo and soap from your hair and body.   2. Using clean hands, apply about 2 ounces of soap gently on your skin from the neck to your toes. Use on your groin area last. Do not use this soap on your face or head. If you get any soap in your eyes, ears or mouth, rinse right away.   3. Repeat step 2. It is very important to let the soap stay on your  skin for at least 1 minute.   4. Rinse well and dry off using a clean towel.If you feel any tingling, itching or other irritation, rinse right away. It is normal to feel some coolness on the skin after using the antiseptic soap. Your skin may feel a bit dry after the shower, but do not use any lotions, creams or moisturizers. Do not use hair spray or other products in your hair.  5. Dress in clean clothes.  If you have any questions about showering or an allergy to CHG soap, please call the Preadmissions Nursing Department at the hospital where you are having your surgery.  [facility], [place] ([place]): [phone]  This phone number will be answered between the hours of 8:00 a.m. and 6:30 p.m. Monday through Friday.                    If you have any questions regarding your visit please contact your care team:     Cardiology  Telephone Number   Kimberlee GIBSON, VERONIQUE HERNADEZ,VERONIQUE MYERS, WALTER LOGAN, MIKALA LIVINGSTON LPN   [phone]    *After hours: [phone]   For scheduling appts:     [phone] or    [phone] (select option 1)    *After hours: [phone]     For the Device Clinic (Pacemakers and ICD's)  RN's :  Gaby Boggs   During business hours: [phone]    *After business hours:  [phone] (select option 4)      Normal test result notifications will be released via [employer] or mailed within 7 business days.  All other test results, will be communicated via telephone once reviewed by your cardiologist.    If you need a medication refill please contact your pharmacy.  Please allow 3 business days for your refill to be completed.    As always, thank you for trusting us with your health care needs!        Patient Education     Having Catheter Ablation    A heart rhythm problem (arrhythmia) can make your heart beat too fast or in an irregular pattern. The problem is often caused by cells in your heart that aren t working as they should. Or, it may be the result  of an abnormal electric circuit. It may cause bothersome symptoms, such as an irregular heartbeat, dizziness, shortness of breath, chest pain, or fainting. Your doctor has recommended catheter ablation to treat your arrhythmia. This non-surgical procedure destroys the cells that are causing the problem.  Before the procedure  Before your catheter ablation, you will meet with a specially trained heart doctor (cardiac electrophysiologist) who will do the procedure. He or she will tell you how to get ready. You will likely be told to stop or change your heart rhythm medicines for a period of time before the procedure. Follow your doctor s instructions. Also:    Tell the doctor about all prescription and over-the-counter medicines you take. This includes herbs, supplements, and vitamins. It also includes daily medicines, such as insulin or blood thinners. If you are allergic to any medicines, tell the doctor.    Have any routine tests, such as blood tests, as recommended.    Don t eat or drink anything 12 hours before the procedure.  How catheter ablation is done  Catheter ablation uses thin, flexible wires (electrode catheters) to find and destroy (ablate) problem cells. Here s how the procedure is done:    The heart s signals are mapped. To find the problem, an electrophysiology study (EPS) is done. During this study, the doctor tries to start (induce) your arrhythmia. An electrical map of the heart is then created. This shows the type of arrhythmia you have and where the problem is. Using the map as a guide, the doctor knows where to ablate.    Problem areas are destroyed using heat or cold therapy. Once the EPS shows where the problem is, the doctor threads an electrode catheter through a blood vessel to that area in the heart. Energy is sent through the catheter to destroy the problem cells.    The heart s rhythm is tested again. After ablating the problem cells, the doctor tries to restart (reinduce) your  arrhythmia. If a fast rhythm can t be induced, the ablation is a success. But if a fast rhythm does start again, you may need more ablation.  Your experience during catheter ablation  In most cases, catheter ablation is done in an electrophysiology (EP) lab. It often takes 2 to 4 hours, and sometimes longer. You ll receive medicine to prevent pain. Medicine will also help you relax or sleep during the procedure. If you feel uncomfortable during the procedure, tell the doctor or nurse:    Getting started. The healthcare team washes the skin on your groin (or rarely, the neck). Any hair in that area may be removed. This is where the catheters will be inserted. An IV (intravenous) line is started in your arm. Medicines and fluids are given through this IV. To help keep the insertion site germ-free (sterile), your body is draped with sheets. Only the area where the catheters will be inserted is exposed.    Inserting the catheters. The healthcare provider numbs the skin where the catheters will be inserted with pain medicine (local anesthetic). Then the provider uses a small needle to make punctures in your vein or artery. He or she puts catheters through these punctures and guides them to your heart. The provider uses X-ray monitors to help guide the catheters.    The provider then puts wires in several places in the heart to map the electrical signals. The wires also stimulate the heart.    Finishing up. When the procedure is finished, the provider takes the catheters out of your body. He or she puts pressure on the puncture sites to stop any bleeding. No stitches are needed. You re then taken to a recovery room to rest. You'll need to remain lying down for 2 to 6 hours. You'll also be asked not to move the leg where the catheters were inserted for a few hours. This is to make sure the insertion sites don't bleed.  Risks and complications  The risks of catheter ablation are fairly low compared to the benefits you  receive. Discuss these risks with your doctor before the procedure. Possible risks and complications include:    Bleeding or bruising at the catheter insertion site    Blood clots    A slow heart rhythm (requiring a permanent pacemaker)    Perforation of the heart muscle, blood vessel, or lung (may require an emergency procedure)    Damage to a heart valve (rare)    Stroke or heart attack, also known as acute myocardial infarction, or AMI (rare)    Infection, which is a risk after any invasive procedure. This may be indicated by a fever of 100.4 F (38.0 C) or higher, drainage, or redness and pain at the catheter insertion site.    Death (extremely rare)   Date Last Reviewed: 5/1/2016 2000-2018 [employer]. [address] [place] [address], [place] [address]. All rights reserved. This information is not intended as a substitute for professional medical care. Always follow your healthcare professional's instructions.

## 2019-04-24 ENCOUNTER — OFFICE VISIT (OUTPATIENT)
Dept: FAMILY MEDICINE | Facility: CLINIC | Age: 44
End: 2019-04-24
Payer: COMMERCIAL

## 2019-04-24 VITALS
SYSTOLIC BLOOD PRESSURE: 135 MMHG | OXYGEN SATURATION: 98 % | HEART RATE: 87 BPM | TEMPERATURE: 97.8 F | BODY MASS INDEX: 27.69 KG/M2 | RESPIRATION RATE: 18 BRPM | WEIGHT: 193 LBS | DIASTOLIC BLOOD PRESSURE: 85 MMHG

## 2019-04-24 DIAGNOSIS — S39.012A STRAIN OF LUMBAR REGION, INITIAL ENCOUNTER: Primary | ICD-10-CM

## 2019-04-24 PROCEDURE — 99213 OFFICE O/P EST LOW 20 MIN: CPT | Performed by: PHYSICIAN ASSISTANT

## 2019-04-24 RX ORDER — METHOCARBAMOL 500 MG/1
500 TABLET, FILM COATED ORAL 3 TIMES DAILY
Qty: 30 TABLET | Refills: 0 | Status: SHIPPED | OUTPATIENT
Start: 2019-04-24 | End: 2019-06-17

## 2019-04-24 RX ORDER — NAPROXEN 500 MG/1
500 TABLET ORAL 2 TIMES DAILY WITH MEALS
Qty: 60 TABLET | Refills: 0 | Status: SHIPPED | OUTPATIENT
Start: 2019-04-24 | End: 2019-06-17

## 2019-04-24 ASSESSMENT — PAIN SCALES - GENERAL: PAINLEVEL: SEVERE PAIN (7)

## 2019-04-24 NOTE — PROGRESS NOTES
SUBJECTIVE:                                                    Bill Wadsworth is a 44 year old male who presents to clinic today for the following health issues:    Back Pain      Duration: 1 week        Specific cause: lifting a bucket of water    Description:   Location of pain: low back   Character of pain: sharp  Pain radiation:none  New numbness or weakness in legs, not attributed to pain:  no     Intensity: Currently 7/10    History:   Pain interferes with job: YES  History of back problems: Yes  Any previous MRI or X-rays: yes at [facility]  Sees a specialist for back pain:  No  Therapies tried without relief: advil and tylenol- very slight relief     Alleviating factors:   Improved by: none      Precipitating factors:  Worsened by: Lifting, Bending, Standing, Sitting and Walking        Discuss Vasectomy       Problem list and histories reviewed & adjusted, as indicated.  Additional history: as documented    Patient Active Problem List   Diagnosis     CARDIOVASCULAR SCREENING; LDL GOAL LESS THAN 160     Impingement syndrome of right shoulder     Pectoralis muscle strain     Fear of flying     Closed fracture of metatarsal bone     Past Surgical History:   Procedure Laterality Date     APPENDECTOMY OPEN  2004     surgery      cheek bone surgery       Social History     Tobacco Use     Smoking status: Never Smoker     Smokeless tobacco: Never Used   Substance Use Topics     Alcohol use: Yes     Comment: 4-5 drinks per wk     Family History   Problem Relation Age of Onset     Hypertension Mother      Cancer Paternal Grandmother      Cancer - colorectal Paternal Grandfather          Current Outpatient Medications   Medication Sig Dispense Refill     methocarbamol (ROBAXIN) 500 MG tablet Take 1 tablet (500 mg) by mouth 3 times daily 30 tablet 0     naproxen (NAPROSYN) 500 MG tablet Take 1 tablet (500 mg) by mouth 2 times daily (with meals) 60 tablet 0     No Known Allergies  Problem list, Medication list,  Allergies, and Medical/Social/Surgical histories reviewed in [facility] and updated as appropriate.      OBJECTIVE:                                                    /85   Pulse 87   Temp 97.8  F (36.6  C) (Oral)   Resp 18   Wt 87.5 kg (193 lb)   SpO2 98%   BMI 27.69 kg/m    Body mass index is 27.69 kg/m .   GENERAL: healthy, alert, well nourished, well hydrated, no distress  RESP: lungs clear to auscultation - no rales, no rhonchi, no wheezes  CV: regular rates and rhythm, normal S1 S2, no S3 or S4 and no murmur, no click or rub -  ABDOMEN: soft, no tenderness, no  hepatosplenomegaly, no masses, normal bowel sounds  Lumber/Thoracic Spine Exam: Tender:  right para lumbar muscles  Non-tender:  thoracic spinous processes, lumbar spinous processes  Range of Motion:  full range of motion lyssa lower extremities   Strength:  5/5 lyssa lower extremities   Special tests:  negative straight leg raises  Hip Exam: Hip ROM full         ASSESSMENT/PLAN:                                                        ICD-10-CM    1. Strain of lumbar region, initial encounter S39.012A naproxen (NAPROSYN) 500 MG tablet     methocarbamol (ROBAXIN) 500 MG tablet   1. Active range of motion exercises encouraged  Activity modification.  Ice or heat 20 mins every 2-3 hrs as needed.  warning signs discussed. side effects discussed  Recheck 4 wks as needed.      Dr. Palm did consult:   The patient presents for discussion of vasectomy.  The procedure was explained in detail using diagram from the vasectomy pamphlet. The permanency and effactiveness of this operation were explained in detail, including casectomy failure rate, bleeding, infection, scarring, chronic pain and epididymititis.  The patient was told to stay at bedrest for 48-72 hours, no heavy lifting for one week and to refrain from sex for 1 week.  The patient was also told to have a post operative sperm count at 3 months.  He should have another birth control until he has a  sample that is infertile.    Approximately, 20 minutes were used in the discussion of the vasectomy.    Arun Dasilva PA-C  [facility]

## 2019-04-25 PROBLEM — I48.0 PAROXYSMAL ATRIAL FIBRILLATION (H): Status: ACTIVE | Noted: 2019-04-25

## 2019-04-25 RX ORDER — LIDOCAINE 40 MG/G
CREAM TOPICAL
Status: CANCELLED | OUTPATIENT
Start: 2019-04-25

## 2019-04-29 ENCOUNTER — TELEPHONE (OUTPATIENT)
Dept: CARDIOLOGY | Facility: CLINIC | Age: 44
End: 2019-04-29

## 2019-04-29 NOTE — TELEPHONE ENCOUNTER
EP Scheduling called the patient to schedule A-fib Ablation with Dr. Dewitt. The number [phone] was left for the patient to return the call and schedule the procedure.    Sonali Arboleda  Periop Electrophysiology   [phone]

## 2019-05-02 ENCOUNTER — OFFICE VISIT (OUTPATIENT)
Dept: FAMILY MEDICINE | Facility: CLINIC | Age: 44
End: 2019-05-02
Payer: COMMERCIAL

## 2019-05-02 VITALS
OXYGEN SATURATION: 98 % | BODY MASS INDEX: 27.69 KG/M2 | WEIGHT: 193 LBS | RESPIRATION RATE: 18 BRPM | TEMPERATURE: 98.7 F | SYSTOLIC BLOOD PRESSURE: 135 MMHG | DIASTOLIC BLOOD PRESSURE: 85 MMHG | HEART RATE: 87 BPM

## 2019-05-02 DIAGNOSIS — Z30.2 ENCOUNTER FOR VASECTOMY: Primary | ICD-10-CM

## 2019-05-02 PROCEDURE — 55250 REMOVAL OF SPERM DUCT(S): CPT | Performed by: FAMILY MEDICINE

## 2019-05-02 PROCEDURE — 88302 TISSUE EXAM BY PATHOLOGIST: CPT | Performed by: FAMILY MEDICINE

## 2019-05-02 ASSESSMENT — PAIN SCALES - GENERAL: PAINLEVEL: NO PAIN (0)

## 2019-05-02 NOTE — PROGRESS NOTES
SUBJECTIVE:  Informed consent was obtained.  An opportunity for questions was given, these were answered to his satisfaction.  Vasectomy literature was reviewed at his consult and post vas instruction sheets have also been reviewed.  He knows that it is meant to be a permanent procedure and that he needs to bring in a post vas specimen in eight to twelve weeks after twelve to fifteen ejaculations and have a negative semen analysis with no sperm seen before he can be considered sterile. He wishes to proceed      PROCEDURE:  The penis was taped up on to the abdomen. A shave was preformed. The scrotum was prepped with Betadine and draped in a sterile fashion.  Attention was turned to the right vas; this was palpated and the area infiltrated with 1% Lidocaine plain.after the vas had been pulled to midline.  A 1 1/2 cm. incision was made in the skin and blunt dissection carried out through the subcutaneous tissue.  The vas was grasped with the vas towel clamp and brought to the surface.  The ramon vas tissue was then dissected free.  The vas was then doubly clamped and a 3 mm. section excised.  The proximal vas waqs clamped x 2 and the distal end x 1.The vas was sent to Pathology for confirmation.  The ends were then cauterized.  Any bleeders were controlled with the pursestring suture and/or cautery. The ends were inspected and hemostasis was deemed to be adequate.  The two ends were then delivered into the scrotum.  Attention was then turned to the left vas and a similar procedure was carried out using the same initial incision.    Specimens sent of the right and left vas.     COMPLICATIONS:  none    PLAN:  He is going to take it easy over the next couple of days.  He will follow the instructions on his post vas instruction sheet.  James Palm

## 2019-05-02 NOTE — NURSING NOTE
"Chief Complaint   Patient presents with     Procedure     vasectomy       Initial /85   Pulse 87   Temp 98.7  F (37.1  C) (Oral)   Resp 18   Wt 87.5 kg (193 lb)   SpO2 98%   BMI 27.69 kg/m   Estimated body mass index is 27.69 kg/m  as calculated from the following:    Height as of 6/30/17: 1.778 m (5' 10\").    Weight as of this encounter: 87.5 kg (193 lb).  Medication Reconciliation: complete  Bettie Delarosa M.A.    "

## 2019-05-02 NOTE — LETTER
"            [facility]  [address] [facility] [address]  Phone: [phone]                                                                            Affirmation of Informed Consent for Surgery or Invasive Procedure    1.  I, (print patient's name) Bill J Ermelinda, WALTER 1975,   a.  Agree that I will have (include both the medical term and patient words):           Chief Complaint   Patient presents with     Procedure     vasectomy      b.  At [facility].     c.  The reason for this procedure is (medical condition):  vasectomy.   d.  This will be done or supervised by:  James Palm MD.   e.  My doctor may have help from others.  Help could include opening or closing         the wound. Help might also include taking grafts, cutting out tissue,                           implanting devices.  I have been told who will help, if known.     2.  I have talked to my doctor or health care team about:   a.  What the procedure is and what will happen.   b   How it may help me (the benefits).   c.  How it may harm me (the most likely and most serious risks).   d.  The long-term effects the procedure might have.    e.  My other choices for treatment.  The risks and benefits of those choices.    f.   What will likely happen if I say \"no\" to this procedure.    g.  How I might feel right after and how quickly I might be expected to recover.      h.  What medicines will be used to manage pain or sedate me.     3.  I agree that:  (If I do not agree with a statement, I have crossed it out and              initialed next to it.)       a.  I will ask questions.     b.  No one has promised me definite results.    c.  If serious problems are found during the procedure, the treatment may                    change.   d.  If I have \"do not resuscitate\" (DNR) wishes, I have discussed this with my                              doctor and they will be put on hold during the procedure.   e. Students and " other appropriate people, approved by the facility, may watch                      the procedure and help with tasks they are qualified for.                                                    f.  Pictures or videos may be taken. They may be used for medical or                  educational reasons only.       g. Tissues or organs removed from my body as part of the normal course of the                    procedure may be used for research or teaching purposes. They will be                  disposed of with respect.                    h.  If a staff person is exposed to my blood or body fluids, my blood will be drawn                   and tested for HIV and hepatitis.  I understand that by law, the test results will                    go:         -  In my medical record.                         -  To the Employee Occupational Health Service and/or Infection Control                                  at this facility.    -  To the [place] [facility] officials.                 i.  I may have a blood transfusion: I have talked to my doctor or care team about:    -  Why I may need a blood transfusion.     - The risks, benefits, and side effects of transfusion - and the risks of not        Having one.     -  Blood safety and other options for treatment.        Consent for blood transfusion obtained during a hospital admission is valid for the entire hospital stay.  Consent  for blood transfusion obtained in the clinic setting is valid for a year from the signature date.                                4.  I understand that:   a.  I can change my mind.  If I do, I must tell my doctor or team as soon as                           possible.              b.  The team members may change during the procedure.                c.   The team will double-check who I am.  They will ask me what I am having                         done and the site of the site of the procedure.  This is done for my safety.    My questions have been answered.   I agree to the procedure.  I have made my special needs and instructions known.      Bill CHEKO Wadsworth      5/2/2019 8:56 AM  Patient (or representative)/Relationship to patient             Date  Time      Witness:  I have verified that the signature is that of the patient or patient's representative and that this has been signed before the procedure:    NAME:        5/2/2019  8:56 AM         Date  Time  Person verifying patient's name or patient representative's signature     Provider:  I have discussed the procedure and the information stated above with the patient (or the patient's representative) and answered their questions. The patient or their representative consented to the procedure:      James Palm MD    5/2/2019  8:56 AM  Physician or Provider's Signature(s)   Date  Time       Intepreter (if used):       5/2/2019  8:56 AM                                   Name       Language/Organization Date  Time    Consent for procedure valid for 30 days after patient signature date     [facility]  AFFIRMATION OF INFORMED CONSENT FOR SURGERY OR INVASIVE PROCEDURE               Original - Medical Records

## 2019-05-06 LAB — COPATH REPORT: NORMAL

## 2019-05-23 ENCOUNTER — OFFICE VISIT (OUTPATIENT)
Dept: URGENT CARE | Facility: URGENT CARE | Age: 44
End: 2019-05-23
Payer: COMMERCIAL

## 2019-05-23 VITALS
BODY MASS INDEX: 28.84 KG/M2 | TEMPERATURE: 97.1 F | OXYGEN SATURATION: 98 % | WEIGHT: 201 LBS | HEART RATE: 56 BPM | SYSTOLIC BLOOD PRESSURE: 147 MMHG | DIASTOLIC BLOOD PRESSURE: 93 MMHG

## 2019-05-23 DIAGNOSIS — Z98.52 S/P VASECTOMY: Primary | ICD-10-CM

## 2019-05-23 PROCEDURE — 99207 ZZC BUNDLED PROCEDURE IN GLOBAL PKG: CPT | Performed by: FAMILY MEDICINE

## 2019-05-23 ASSESSMENT — ENCOUNTER SYMPTOMS
SHORTNESS OF BREATH: 0
COUGH: 0
DIARRHEA: 0
CHILLS: 0
VOMITING: 0
SORE THROAT: 0
RHINORRHEA: 0
NAUSEA: 0
WOUND: 1
DYSURIA: 0
DIAPHORESIS: 0
FEVER: 0
DIFFICULTY URINATING: 0

## 2019-05-24 NOTE — PROGRESS NOTES
SUBJECTIVE:   Bill Wadsworth is a 44 year old male presenting with a chief complaint of   Chief Complaint   Patient presents with     Suture Removal     patient states there is still 1 suture that has not disolved from his vasectomy on 5/2/2019.  He states there was some redness and swelling around the suture but it is now gone.      Noted small nodule at incision site of the vasectomy on 5/2/19 seems to be improving.     Review of Systems   Constitutional: Negative for chills, diaphoresis and fever.   HENT: Negative for congestion, ear pain, rhinorrhea and sore throat.    Respiratory: Negative for cough and shortness of breath.    Gastrointestinal: Negative for diarrhea, nausea and vomiting.   Genitourinary: Negative for decreased urine volume, difficulty urinating, discharge, dysuria, penile pain, scrotal swelling, testicular pain and urgency.   Skin: Positive for wound (incision site swelling mild ).       Past Medical History:   Diagnosis Date     Hypertension goal BP (blood pressure) < 140/90      Pectoralis muscle rupture 7/2014    left     Family History   Problem Relation Age of Onset     Hypertension Mother      Cancer Paternal Grandmother      Cancer - colorectal Paternal Grandfather      Current Outpatient Medications   Medication Sig Dispense Refill     dabigatran ANTICOAGULANT (PRADAXA ANTICOAGULANT) 150 MG capsule Take 1 capsule (150 mg) by mouth 2 times daily Store in original 's bottle or blister pack; use within 120 days of opening. (Patient not taking: Reported on 5/23/2019) 60 capsule 3     methocarbamol (ROBAXIN) 500 MG tablet Take 1 tablet (500 mg) by mouth 3 times daily (Patient not taking: Reported on 5/23/2019) 30 tablet 0     naproxen (NAPROSYN) 500 MG tablet Take 1 tablet (500 mg) by mouth 2 times daily (with meals) (Patient not taking: Reported on 5/23/2019) 60 tablet 0     Social History     Tobacco Use     Smoking status: Never Smoker     Smokeless tobacco: Never Used    Substance Use Topics     Alcohol use: Yes     Comment: 4-5 drinks per wk       OBJECTIVE  BP (!) 147/93   Pulse 56   Temp 97.1  F (36.2  C) (Tympanic)   Wt 91.2 kg (201 lb)   SpO2 98%   BMI 28.84 kg/m      Physical Exam   Constitutional: He appears well-developed.   HENT:   Head: Normocephalic.   Eyes: Pupils are equal, round, and reactive to light.   Neck: Normal range of motion.   Cardiovascular: Normal rate.   Pulmonary/Chest: Effort normal.   Genitourinary:   Genitourinary Comments: Noted midline small incision appears to be healing normally with mild erythema and normal healing granulation tissue.     Testicles not tender or swollen and scrotal skin normal in appearance without spreading redness.          ASSESSMENT:    ICD-10-CM    1. S/P vasectomy Z98.52       Normal healing post vasectomy.       PLAN  Advised to keep area clean and avoid touching the area.   Signs of true skin infection emphasized as well as reasons for follow-up   The patient indicates understanding of these issues and agrees with the plan.  Davy Pak MD

## 2019-06-18 ENCOUNTER — HOSPITAL ENCOUNTER (OUTPATIENT)
Dept: CARDIOLOGY | Facility: CLINIC | Age: 44
Discharge: HOME | End: 2019-06-18
Attending: INTERNAL MEDICINE | Admitting: INTERNAL MEDICINE
Payer: COMMERCIAL

## 2019-06-18 ENCOUNTER — ANESTHESIA EVENT (OUTPATIENT)
Dept: CARDIOLOGY | Facility: CLINIC | Age: 44
End: 2019-06-18
Payer: COMMERCIAL

## 2019-06-18 ENCOUNTER — HOSPITAL ENCOUNTER (OUTPATIENT)
Dept: CT IMAGING | Facility: CLINIC | Age: 44
End: 2019-06-18
Attending: INTERNAL MEDICINE
Payer: COMMERCIAL

## 2019-06-18 VITALS
OXYGEN SATURATION: 96 % | HEART RATE: 101 BPM | DIASTOLIC BLOOD PRESSURE: 80 MMHG | SYSTOLIC BLOOD PRESSURE: 121 MMHG | RESPIRATION RATE: 12 BRPM

## 2019-06-18 DIAGNOSIS — I48.0 PAROXYSMAL ATRIAL FIBRILLATION (H): ICD-10-CM

## 2019-06-18 PROCEDURE — 25000125 ZZHC RX 250: Performed by: INTERNAL MEDICINE

## 2019-06-18 PROCEDURE — 93320 DOPPLER ECHO COMPLETE: CPT | Mod: 26 | Performed by: INTERNAL MEDICINE

## 2019-06-18 PROCEDURE — 93312 ECHO TRANSESOPHAGEAL: CPT | Mod: 26 | Performed by: INTERNAL MEDICINE

## 2019-06-18 PROCEDURE — 75572 CT HRT W/3D IMAGE: CPT

## 2019-06-18 PROCEDURE — 99152 MOD SED SAME PHYS/QHP 5/>YRS: CPT

## 2019-06-18 PROCEDURE — 75572 CT HRT W/3D IMAGE: CPT | Mod: 26 | Performed by: INTERNAL MEDICINE

## 2019-06-18 PROCEDURE — 99153 MOD SED SAME PHYS/QHP EA: CPT

## 2019-06-18 PROCEDURE — 25000128 H RX IP 250 OP 636: Performed by: INTERNAL MEDICINE

## 2019-06-18 PROCEDURE — 76376 3D RENDER W/INTRP POSTPROCES: CPT

## 2019-06-18 PROCEDURE — 93325 DOPPLER ECHO COLOR FLOW MAPG: CPT

## 2019-06-18 PROCEDURE — 93325 DOPPLER ECHO COLOR FLOW MAPG: CPT | Mod: 26 | Performed by: INTERNAL MEDICINE

## 2019-06-18 RX ORDER — SODIUM CHLORIDE 9 MG/ML
INJECTION, SOLUTION INTRAVENOUS CONTINUOUS PRN
Status: DISCONTINUED | OUTPATIENT
Start: 2019-06-18 | End: 2019-06-19 | Stop reason: HOSPADM

## 2019-06-18 RX ORDER — LIDOCAINE HYDROCHLORIDE 20 MG/ML
15 SOLUTION OROPHARYNGEAL ONCE
Status: COMPLETED | OUTPATIENT
Start: 2019-06-18 | End: 2019-06-18

## 2019-06-18 RX ORDER — NALOXONE HYDROCHLORIDE 0.4 MG/ML
.1-.4 INJECTION, SOLUTION INTRAMUSCULAR; INTRAVENOUS; SUBCUTANEOUS
Status: DISCONTINUED | OUTPATIENT
Start: 2019-06-18 | End: 2019-06-19 | Stop reason: HOSPADM

## 2019-06-18 RX ORDER — IOPAMIDOL 755 MG/ML
110 INJECTION, SOLUTION INTRAVASCULAR ONCE
Status: COMPLETED | OUTPATIENT
Start: 2019-06-18 | End: 2019-06-18

## 2019-06-18 RX ORDER — FENTANYL CITRATE 50 UG/ML
25 INJECTION, SOLUTION INTRAMUSCULAR; INTRAVENOUS
Status: DISCONTINUED | OUTPATIENT
Start: 2019-06-18 | End: 2019-06-19 | Stop reason: HOSPADM

## 2019-06-18 RX ORDER — LIDOCAINE 40 MG/G
CREAM TOPICAL
Status: DISCONTINUED | OUTPATIENT
Start: 2019-06-18 | End: 2019-06-19 | Stop reason: HOSPADM

## 2019-06-18 RX ORDER — FLUMAZENIL 0.1 MG/ML
0.2 INJECTION, SOLUTION INTRAVENOUS
Status: DISCONTINUED | OUTPATIENT
Start: 2019-06-18 | End: 2019-06-19 | Stop reason: HOSPADM

## 2019-06-18 RX ORDER — FENTANYL CITRATE 50 UG/ML
50 INJECTION, SOLUTION INTRAMUSCULAR; INTRAVENOUS ONCE
Status: COMPLETED | OUTPATIENT
Start: 2019-06-18 | End: 2019-06-18

## 2019-06-18 RX ADMIN — IOPAMIDOL 110 ML: 755 INJECTION, SOLUTION INTRAVENOUS at 12:36

## 2019-06-18 RX ADMIN — MIDAZOLAM 4 MG: 1 INJECTION INTRAMUSCULAR; INTRAVENOUS at 10:48

## 2019-06-18 RX ADMIN — TOPICAL ANESTHETIC 0.5 ML: 200 SPRAY DENTAL; PERIODONTAL at 10:36

## 2019-06-18 RX ADMIN — FENTANYL CITRATE 100 MCG: 50 INJECTION INTRAMUSCULAR; INTRAVENOUS at 10:48

## 2019-06-18 RX ADMIN — LIDOCAINE HYDROCHLORIDE 30 ML: 20 SOLUTION ORAL; TOPICAL at 10:36

## 2019-06-18 NOTE — PROGRESS NOTES
Pt here for SONNY. Procedure was explained to the pt and the consent was signed. Discharge instructions were reviewed and a copy was given to the pt. Pt was given Fentanyl 100 mcg IV and Versed 4 mg IV for sedation. Pt tolerated the procedure without any adverse effects. Pt to remain NPO until 1235pm. Probe # 58 was used. Pt denies any pain or discomfort post procedure. Pt dc'd with father to drive home.

## 2019-06-19 ENCOUNTER — HOSPITAL ENCOUNTER (OUTPATIENT)
Facility: CLINIC | Age: 44
Discharge: HOME | End: 2019-06-19
Attending: INTERNAL MEDICINE | Admitting: INTERNAL MEDICINE
Payer: COMMERCIAL

## 2019-06-19 ENCOUNTER — ANESTHESIA (OUTPATIENT)
Dept: CARDIOLOGY | Facility: CLINIC | Age: 44
End: 2019-06-19
Payer: COMMERCIAL

## 2019-06-19 VITALS
RESPIRATION RATE: 14 BRPM | DIASTOLIC BLOOD PRESSURE: 95 MMHG | HEART RATE: 87 BPM | TEMPERATURE: 98.7 F | BODY MASS INDEX: 29.19 KG/M2 | HEIGHT: 69 IN | SYSTOLIC BLOOD PRESSURE: 141 MMHG | WEIGHT: 197.09 LBS | OXYGEN SATURATION: 95 %

## 2019-06-19 DIAGNOSIS — I48.0 PAROXYSMAL ATRIAL FIBRILLATION (H): ICD-10-CM

## 2019-06-19 PROBLEM — Z86.79 S/P ABLATION OF ATRIAL FIBRILLATION: Status: ACTIVE | Noted: 2019-06-19

## 2019-06-19 PROBLEM — Z98.890 S/P ABLATION OF ATRIAL FIBRILLATION: Status: ACTIVE | Noted: 2019-06-19

## 2019-06-19 LAB
ABO + RH BLD: NORMAL
ABO + RH BLD: NORMAL
ANION GAP SERPL CALCULATED.3IONS-SCNC: 5 MMOL/L (ref 3–14)
BLD GP AB SCN SERPL QL: NORMAL
BLOOD BANK CMNT PATIENT-IMP: NORMAL
BUN SERPL-MCNC: 16 MG/DL (ref 7–30)
CALCIUM SERPL-MCNC: 8.5 MG/DL (ref 8.5–10.1)
CHLORIDE SERPL-SCNC: 109 MMOL/L (ref 94–109)
CO2 SERPL-SCNC: 27 MMOL/L (ref 20–32)
CREAT SERPL-MCNC: 1.1 MG/DL (ref 0.66–1.25)
ERYTHROCYTE [DISTWIDTH] IN BLOOD BY AUTOMATED COUNT: 12.8 % (ref 10–15)
GFR SERPL CREATININE-BSD FRML MDRD: 82 ML/MIN/{1.73_M2}
GLUCOSE BLDC GLUCOMTR-MCNC: 107 MG/DL (ref 70–99)
GLUCOSE SERPL-MCNC: 104 MG/DL (ref 70–99)
HCT VFR BLD AUTO: 45.4 % (ref 40–53)
HGB BLD-MCNC: 14.9 G/DL (ref 13.3–17.7)
KCT BLD-ACNC: 175 SEC (ref 75–150)
KCT BLD-ACNC: 211 SEC (ref 75–150)
KCT BLD-ACNC: 288 SEC (ref 75–150)
KCT BLD-ACNC: 292 SEC (ref 75–150)
KCT BLD-ACNC: 308 SEC (ref 75–150)
KCT BLD-ACNC: 324 SEC (ref 75–150)
KCT BLD-ACNC: 328 SEC (ref 75–150)
KCT BLD-ACNC: 340 SEC (ref 75–150)
KCT BLD-ACNC: 388 SEC (ref 75–150)
MCH RBC QN AUTO: 29.8 PG (ref 26.5–33)
MCHC RBC AUTO-ENTMCNC: 32.8 G/DL (ref 31.5–36.5)
MCV RBC AUTO: 91 FL (ref 78–100)
PLATELET # BLD AUTO: 177 10E9/L (ref 150–450)
POTASSIUM SERPL-SCNC: 4.4 MMOL/L (ref 3.4–5.3)
RBC # BLD AUTO: 5 10E12/L (ref 4.4–5.9)
SODIUM SERPL-SCNC: 141 MMOL/L (ref 133–144)
SPECIMEN EXP DATE BLD: NORMAL
WBC # BLD AUTO: 5.1 10E9/L (ref 4–11)

## 2019-06-19 PROCEDURE — C1733 CATH, EP, OTHR THAN COOL-TIP: HCPCS | Performed by: INTERNAL MEDICINE

## 2019-06-19 PROCEDURE — C1894 INTRO/SHEATH, NON-LASER: HCPCS | Performed by: INTERNAL MEDICINE

## 2019-06-19 PROCEDURE — 93656 COMPRE EP EVAL ABLTJ ATR FIB: CPT | Mod: GC | Performed by: INTERNAL MEDICINE

## 2019-06-19 PROCEDURE — 80048 BASIC METABOLIC PNL TOTAL CA: CPT | Performed by: INTERNAL MEDICINE

## 2019-06-19 PROCEDURE — 93662 INTRACARDIAC ECG (ICE): CPT | Performed by: INTERNAL MEDICINE

## 2019-06-19 PROCEDURE — 93005 ELECTROCARDIOGRAM TRACING: CPT

## 2019-06-19 PROCEDURE — 93656 COMPRE EP EVAL ABLTJ ATR FIB: CPT | Performed by: INTERNAL MEDICINE

## 2019-06-19 PROCEDURE — 93613 INTRACARDIAC EPHYS 3D MAPG: CPT | Performed by: INTERNAL MEDICINE

## 2019-06-19 PROCEDURE — C1887 CATHETER, GUIDING: HCPCS | Performed by: INTERNAL MEDICINE

## 2019-06-19 PROCEDURE — 99214 OFFICE O/P EST MOD 30 MIN: CPT | Performed by: NURSE PRACTITIONER

## 2019-06-19 PROCEDURE — 25000125 ZZHC RX 250: Performed by: INTERNAL MEDICINE

## 2019-06-19 PROCEDURE — 86900 BLOOD TYPING SEROLOGIC ABO: CPT | Performed by: NEUROLOGICAL SURGERY

## 2019-06-19 PROCEDURE — 86901 BLOOD TYPING SEROLOGIC RH(D): CPT | Performed by: NEUROLOGICAL SURGERY

## 2019-06-19 PROCEDURE — 85347 COAGULATION TIME ACTIVATED: CPT

## 2019-06-19 PROCEDURE — 36415 COLL VENOUS BLD VENIPUNCTURE: CPT | Performed by: INTERNAL MEDICINE

## 2019-06-19 PROCEDURE — 93662 INTRACARDIAC ECG (ICE): CPT | Mod: 26 | Performed by: INTERNAL MEDICINE

## 2019-06-19 PROCEDURE — 27210794 ZZH OR GENERAL SUPPLY STERILE: Performed by: INTERNAL MEDICINE

## 2019-06-19 PROCEDURE — 93613 INTRACARDIAC EPHYS 3D MAPG: CPT | Mod: GC | Performed by: INTERNAL MEDICINE

## 2019-06-19 PROCEDURE — 25800030 ZZH RX IP 258 OP 636: Performed by: NURSE ANESTHETIST, CERTIFIED REGISTERED

## 2019-06-19 PROCEDURE — 25000565 ZZH ISOFLURANE, EA 15 MIN: Performed by: INTERNAL MEDICINE

## 2019-06-19 PROCEDURE — 25000125 ZZHC RX 250: Performed by: NURSE ANESTHETIST, CERTIFIED REGISTERED

## 2019-06-19 PROCEDURE — 86850 RBC ANTIBODY SCREEN: CPT | Performed by: NEUROLOGICAL SURGERY

## 2019-06-19 PROCEDURE — 82962 GLUCOSE BLOOD TEST: CPT

## 2019-06-19 PROCEDURE — C1730 CATH, EP, 19 OR FEW ELECT: HCPCS | Performed by: INTERNAL MEDICINE

## 2019-06-19 PROCEDURE — 40000065 ZZH STATISTIC EKG NON-CHARGEABLE

## 2019-06-19 PROCEDURE — 25000132 ZZH RX MED GY IP 250 OP 250 PS 637: Performed by: ANESTHESIOLOGY

## 2019-06-19 PROCEDURE — 37000008 ZZH ANESTHESIA TECHNICAL FEE, 1ST 30 MIN: Performed by: INTERNAL MEDICINE

## 2019-06-19 PROCEDURE — 25000128 H RX IP 250 OP 636: Performed by: NURSE ANESTHETIST, CERTIFIED REGISTERED

## 2019-06-19 PROCEDURE — 37000009 ZZH ANESTHESIA TECHNICAL FEE, EACH ADDTL 15 MIN: Performed by: INTERNAL MEDICINE

## 2019-06-19 PROCEDURE — C1759 CATH, INTRA ECHOCARDIOGRAPHY: HCPCS | Performed by: INTERNAL MEDICINE

## 2019-06-19 PROCEDURE — 93010 ELECTROCARDIOGRAM REPORT: CPT | Performed by: INTERNAL MEDICINE

## 2019-06-19 PROCEDURE — 85027 COMPLETE CBC AUTOMATED: CPT | Performed by: INTERNAL MEDICINE

## 2019-06-19 RX ORDER — LIDOCAINE 40 MG/G
CREAM TOPICAL
Status: DISCONTINUED | OUTPATIENT
Start: 2019-06-19 | End: 2019-06-19 | Stop reason: HOSPADM

## 2019-06-19 RX ORDER — HYDROMORPHONE HYDROCHLORIDE 1 MG/ML
.3-.5 INJECTION, SOLUTION INTRAMUSCULAR; INTRAVENOUS; SUBCUTANEOUS EVERY 10 MIN PRN
Status: DISCONTINUED | OUTPATIENT
Start: 2019-06-19 | End: 2019-06-19 | Stop reason: HOSPADM

## 2019-06-19 RX ORDER — ONDANSETRON 2 MG/ML
INJECTION INTRAMUSCULAR; INTRAVENOUS PRN
Status: DISCONTINUED | OUTPATIENT
Start: 2019-06-19 | End: 2019-06-19

## 2019-06-19 RX ORDER — NALOXONE HYDROCHLORIDE 0.4 MG/ML
.1-.4 INJECTION, SOLUTION INTRAMUSCULAR; INTRAVENOUS; SUBCUTANEOUS
Status: DISCONTINUED | OUTPATIENT
Start: 2019-06-19 | End: 2019-06-19 | Stop reason: HOSPADM

## 2019-06-19 RX ORDER — HEPARIN SODIUM 200 [USP'U]/100ML
100-600 INJECTION, SOLUTION INTRAVENOUS CONTINUOUS PRN
Status: DISCONTINUED | OUTPATIENT
Start: 2019-06-19 | End: 2019-06-19 | Stop reason: HOSPADM

## 2019-06-19 RX ORDER — SODIUM CHLORIDE, SODIUM LACTATE, POTASSIUM CHLORIDE, CALCIUM CHLORIDE 600; 310; 30; 20 MG/100ML; MG/100ML; MG/100ML; MG/100ML
INJECTION, SOLUTION INTRAVENOUS CONTINUOUS
Status: DISCONTINUED | OUTPATIENT
Start: 2019-06-19 | End: 2019-06-19 | Stop reason: HOSPADM

## 2019-06-19 RX ORDER — MEPERIDINE HYDROCHLORIDE 25 MG/ML
12.5 INJECTION INTRAMUSCULAR; INTRAVENOUS; SUBCUTANEOUS
Status: DISCONTINUED | OUTPATIENT
Start: 2019-06-19 | End: 2019-06-19 | Stop reason: HOSPADM

## 2019-06-19 RX ORDER — OXYCODONE AND ACETAMINOPHEN 5; 325 MG/1; MG/1
1 TABLET ORAL EVERY 4 HOURS PRN
Status: DISCONTINUED | OUTPATIENT
Start: 2019-06-19 | End: 2019-06-19 | Stop reason: HOSPADM

## 2019-06-19 RX ORDER — ACETAMINOPHEN 325 MG/1
975 TABLET ORAL ONCE
Status: COMPLETED | OUTPATIENT
Start: 2019-06-19 | End: 2019-06-19

## 2019-06-19 RX ORDER — PROPOFOL 10 MG/ML
INJECTION, EMULSION INTRAVENOUS PRN
Status: DISCONTINUED | OUTPATIENT
Start: 2019-06-19 | End: 2019-06-19

## 2019-06-19 RX ORDER — FENTANYL CITRATE 50 UG/ML
25-50 INJECTION, SOLUTION INTRAMUSCULAR; INTRAVENOUS
Status: DISCONTINUED | OUTPATIENT
Start: 2019-06-19 | End: 2019-06-19 | Stop reason: HOSPADM

## 2019-06-19 RX ORDER — FENTANYL CITRATE 50 UG/ML
INJECTION, SOLUTION INTRAMUSCULAR; INTRAVENOUS PRN
Status: DISCONTINUED | OUTPATIENT
Start: 2019-06-19 | End: 2019-06-19

## 2019-06-19 RX ORDER — DOBUTAMINE HYDROCHLORIDE 200 MG/100ML
5-40 INJECTION INTRAVENOUS CONTINUOUS PRN
Status: DISCONTINUED | OUTPATIENT
Start: 2019-06-19 | End: 2019-06-19 | Stop reason: HOSPADM

## 2019-06-19 RX ORDER — ONDANSETRON 4 MG/1
4 TABLET, ORALLY DISINTEGRATING ORAL EVERY 30 MIN PRN
Status: DISCONTINUED | OUTPATIENT
Start: 2019-06-19 | End: 2019-06-19 | Stop reason: HOSPADM

## 2019-06-19 RX ORDER — ACETAMINOPHEN 325 MG/1
650 TABLET ORAL EVERY 4 HOURS PRN
Status: DISCONTINUED | OUTPATIENT
Start: 2019-06-19 | End: 2019-06-19 | Stop reason: HOSPADM

## 2019-06-19 RX ORDER — SODIUM CHLORIDE, SODIUM LACTATE, POTASSIUM CHLORIDE, CALCIUM CHLORIDE 600; 310; 30; 20 MG/100ML; MG/100ML; MG/100ML; MG/100ML
INJECTION, SOLUTION INTRAVENOUS CONTINUOUS PRN
Status: DISCONTINUED | OUTPATIENT
Start: 2019-06-19 | End: 2019-06-19

## 2019-06-19 RX ORDER — PROTAMINE SULFATE 10 MG/ML
INJECTION, SOLUTION INTRAVENOUS PRN
Status: DISCONTINUED | OUTPATIENT
Start: 2019-06-19 | End: 2019-06-19

## 2019-06-19 RX ORDER — HEPARIN SODIUM 1000 [USP'U]/ML
INJECTION, SOLUTION INTRAVENOUS; SUBCUTANEOUS PRN
Status: DISCONTINUED | OUTPATIENT
Start: 2019-06-19 | End: 2019-06-19

## 2019-06-19 RX ORDER — DEXAMETHASONE SODIUM PHOSPHATE 4 MG/ML
INJECTION, SOLUTION INTRA-ARTICULAR; INTRALESIONAL; INTRAMUSCULAR; INTRAVENOUS; SOFT TISSUE PRN
Status: DISCONTINUED | OUTPATIENT
Start: 2019-06-19 | End: 2019-06-19

## 2019-06-19 RX ORDER — HEPARIN SODIUM 200 [USP'U]/100ML
100-500 INJECTION, SOLUTION INTRAVENOUS CONTINUOUS PRN
Status: DISCONTINUED | OUTPATIENT
Start: 2019-06-19 | End: 2019-06-19 | Stop reason: HOSPADM

## 2019-06-19 RX ORDER — ONDANSETRON 2 MG/ML
4 INJECTION INTRAMUSCULAR; INTRAVENOUS EVERY 30 MIN PRN
Status: DISCONTINUED | OUTPATIENT
Start: 2019-06-19 | End: 2019-06-19 | Stop reason: HOSPADM

## 2019-06-19 RX ADMIN — PROPOFOL 30 MG: 10 INJECTION, EMULSION INTRAVENOUS at 11:12

## 2019-06-19 RX ADMIN — ROCURONIUM BROMIDE 50 MG: 10 INJECTION INTRAVENOUS at 08:56

## 2019-06-19 RX ADMIN — FENTANYL CITRATE 100 MCG: 50 INJECTION, SOLUTION INTRAMUSCULAR; INTRAVENOUS at 11:12

## 2019-06-19 RX ADMIN — FENTANYL CITRATE 50 MCG: 50 INJECTION, SOLUTION INTRAMUSCULAR; INTRAVENOUS at 13:57

## 2019-06-19 RX ADMIN — SUGAMMADEX 200 MG: 100 INJECTION, SOLUTION INTRAVENOUS at 14:13

## 2019-06-19 RX ADMIN — DEXAMETHASONE SODIUM PHOSPHATE 4 MG: 4 INJECTION, SOLUTION INTRA-ARTICULAR; INTRALESIONAL; INTRAMUSCULAR; INTRAVENOUS; SOFT TISSUE at 08:56

## 2019-06-19 RX ADMIN — ROCURONIUM BROMIDE 50 MG: 10 INJECTION INTRAVENOUS at 09:25

## 2019-06-19 RX ADMIN — ONDANSETRON 4 MG: 2 INJECTION INTRAMUSCULAR; INTRAVENOUS at 09:30

## 2019-06-19 RX ADMIN — HEPARIN SODIUM 1300 UNITS/HR: 10000 INJECTION, SOLUTION INTRAVENOUS at 10:05

## 2019-06-19 RX ADMIN — HEPARIN SODIUM 3000 UNITS: 1000 INJECTION, SOLUTION INTRAVENOUS; SUBCUTANEOUS at 13:13

## 2019-06-19 RX ADMIN — SODIUM CHLORIDE, POTASSIUM CHLORIDE, SODIUM LACTATE AND CALCIUM CHLORIDE: 600; 310; 30; 20 INJECTION, SOLUTION INTRAVENOUS at 08:45

## 2019-06-19 RX ADMIN — PROPOFOL 170 MG: 10 INJECTION, EMULSION INTRAVENOUS at 08:56

## 2019-06-19 RX ADMIN — ROCURONIUM BROMIDE 5 MCG/KG/MIN: 10 INJECTION INTRAVENOUS at 10:00

## 2019-06-19 RX ADMIN — ACETAMINOPHEN 975 MG: 325 TABLET, FILM COATED ORAL at 07:32

## 2019-06-19 RX ADMIN — MIDAZOLAM 2 MG: 1 INJECTION INTRAMUSCULAR; INTRAVENOUS at 08:51

## 2019-06-19 RX ADMIN — HEPARIN SODIUM 3000 UNITS: 1000 INJECTION, SOLUTION INTRAVENOUS; SUBCUTANEOUS at 11:58

## 2019-06-19 RX ADMIN — HEPARIN SODIUM 8000 UNITS: 1000 INJECTION, SOLUTION INTRAVENOUS; SUBCUTANEOUS at 10:02

## 2019-06-19 RX ADMIN — PROTAMINE SULFATE 5 MG: 10 INJECTION, SOLUTION INTRAVENOUS at 13:49

## 2019-06-19 ASSESSMENT — ENCOUNTER SYMPTOMS
SEIZURES: 0
DYSRHYTHMIAS: 1

## 2019-06-19 ASSESSMENT — LIFESTYLE VARIABLES: TOBACCO_USE: 0

## 2019-06-19 ASSESSMENT — COPD QUESTIONNAIRES: COPD: 0

## 2019-06-19 ASSESSMENT — MIFFLIN-ST. JEOR: SCORE: 1779.38

## 2019-06-19 NOTE — ANESTHESIA CARE TRANSFER NOTE
Patient: Bill Wadsworth    Procedure(s):  EP ABLATION FOCAL AFIB    Diagnosis: ATRIAL FIBRILLATION  Diagnosis Additional Information: No value filed.    Anesthesia Type:   No value filed.     Note:  Airway :Face Mask  Patient transferred to:PACU  Comments: Pt denies pain or nausea. Report given to RN. Handoff Report: Identifed the Patient, Identified the Reponsible Provider, Reviewed the pertinent medical history, Discussed the surgical course, Reviewed Intra-OP anesthesia mangement and issues during anesthesia, Set expectations for post-procedure period and Allowed opportunity for questions and acknowledgement of understanding      Vitals: (Last set prior to Anesthesia Care Transfer)    CRNA VITALS  6/19/2019 1403 - 6/19/2019 1441      6/19/2019             Pulse:  100    ART BP:  125/62    SpO2:  100 %    Resp Rate (observed):  16                Electronically Signed By: LUCY Ballesteros CRNA  June 19, 2019  2:41 PM  
no

## 2019-06-19 NOTE — Clinical Note
Potential access sites were evaluated for patency using ultrasound.   The left femoral vein was selected. Access was obtained using a micropuncture 21 guage needle. x2

## 2019-06-19 NOTE — ANESTHESIA PROCEDURE NOTES
Arterial Line Procedure Note  Staff:     Anesthesiologist:  Sariah Tsang MD  Location: In OR After Induction  Procedure Start/Stop Times:     patient identified, IV checked, site marked, risks and benefits discussed, informed consent, monitors and equipment checked, pre-op evaluation and at physician/surgeon's request      Correct Patient: Yes      Correct Position: Yes      Correct Site: Yes      Correct Procedure: Yes      Correct Laterality:  Yes    Site Marked:  Yes  Line Placement:     Procedure:  Arterial Line    Insertion Site:  Radial    Insertion laterality:  Left    Skin Prep: Chloraprep      Patient Prep: patient draped      Local skin infiltration:  None    Ultrasound Guided?: No      Catheter size:  20 gauge, 12 cm    Cath secured with: suture      Dressing:  Tegaderm    Complications:  None obvious    Arterial waveform: Yes      IBP within 10% of NIBP: Yes

## 2019-06-19 NOTE — Clinical Note
Potential access sites were evaluated for patency using ultrasound.   The right femoral vein was selected. Access was obtained using a micropuncture 21 guage needle. x3

## 2019-06-19 NOTE — H&P
Electrophysiology Pre-Procedure History and Physical    Bill Wadsworth MRN# [id number]   Age: 44 year old YOB: 1975      Date of Procedure: 6/19/2019 Location [facility]      Date of Exam 6/19/2019 Facility (In hospital)     Primary care provider: William Rosenberg    HPI: Bill Wadsworth is a 44 year old male who presents for atrial fibrillation ablation.  He has a 2-3 year history of palpitations increasing in frequency over the past few months. Monitoring shows paroxysmal atrial fibrillation. He has a CHADSVASC 1. He has been taking dabigatran and verified that he took his dose this morning. He denies chest pain or pressure, dyspnea at rest or with exertion. 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 -- Points: 0    The patient has not been diagnosed with diabetes. -- Points: 0     The patient has a family history of CHD. -- Points: x2          Closed fracture of metatarsal bone 03/23/2009     Priority: Medium            Medications (include herbals and vitamins):      Current Facility-Administered Medications   Medication     lidocaine (LMX4) cream     lidocaine (LMX4) cream     lidocaine 1 %  "0.1-1 mL     lidocaine 1 % 0.1-1 mL     sodium chloride (PF) 0.9% PF flush 3 mL     sodium chloride (PF) 0.9% PF flush 3 mL     sodium chloride (PF) 0.9% PF flush 3 mL         Bill Wadsworth   Home Medication Instructions DONNA:[id number]    Printed on:06/19/19 1844   Medication Information                      dabigatran ANTICOAGULANT (PRADAXA ANTICOAGULANT) 150 MG capsule  Take 1 capsule (150 mg) by mouth 2 times daily Store in original 's bottle or blister pack; use within 120 days of opening.                      Allergies:    No Known Allergies          Social History:     Social History     Tobacco Use     Smoking status: Never Smoker     Smokeless tobacco: Never Used   Substance Use Topics     Alcohol use: Yes     Comment: 4-5 drinks per wk            Physical Exam:   All vitals have been reviewed  Patient Vitals for the past 8 hrs:   BP Temp Temp src Pulse Resp SpO2 Height Weight   06/19/19 0619 130/89 98.5  F (36.9  C) Oral 52 16 100 % 1.753 m (5' 9\") 89.4 kg (197 lb 1.5 oz)     No intake/output data recorded.  Airway assessment:   Patient is able to open mouth wide  Patient is able to stick out tongue}      ENT:   Normocephalic, without obvious abnormality, atraumatic, sinuses nontender on palpation, external ears without lesions, oral pharynx with moist mucous membranes, tonsils without erythema or exudates, gums normal and good dentition.     Neck:   Supple, symmetrical, trachea midline, no adenopathy, thyroid symmetric, not enlarged and no tenderness, skin normal     Lungs:   No increased work of breathing, good air exchange, clear to auscultation bilaterally, no crackles or wheezing     Cardiovascular:   Normal apical impulse, regular rate and rhythm, normal S1 and S2, no S3 or S4, and no murmur noted             Lab / Radiology Results:     Lab Results   Component Value Date    WBC 5.1 06/19/2019    RBC 5.00 06/19/2019    HGB 14.9 06/19/2019    HCT 45.4 06/19/2019    MCV 91 06/19/2019    " RDW 12.8 06/19/2019     06/19/2019      Lab Results   Component Value Date     06/19/2019    CO2 27 06/19/2019    BUN 16 06/19/2019             Plan:   Patient's active problems diagnostically and therapeutically optimized for the planned procedure. Patient here for atrial fibrillation ablation. He did take his dabigatran this morning. Procedure explained in detail to patient including indications, risks, and benefits. He states understanding and wishes to procced.     LUCY Loredo CNP  Electrophysiology Consult Service  Pager: [phone]        See OR op dictation note

## 2019-06-19 NOTE — DISCHARGE INSTRUCTIONS
Care of groin site:         Remove the Band-Aid after 24 hours. If there is minor oozing, apply another Band-aid and remove it after 12 hours.          Do NOT take a bath, use a hot tub, pool, or submerse in water for at least 3 days You may shower.          It is normal to have a small bruise or lump at the site.         Do not scrub the site.         Do not use lotion or powder near the puncture site for 3 days.         For the first 2 days: Do not stoop or squat. When you cough, sneeze or move your bowels, hold your hand over the puncture site and press gently.         Do not lift more than 10 pounds or exertional activity for 10 days.      If you start bleeding from the site in your groin:  Lie down flat and press firmly on the site.  Call your physician immediately, or, come to the emergency room.    Call 911 right away if you have bleeding that is heavy or does not stop.     Call your doctor/provider if:         You have a large or growing hard lump around the site.         The site is red, swollen, hot or tender.         Blood or fluid is draining from the site.         You have chills or a fever greater than 101 F (38 C).         Your leg or arm turns bluish, feels numb or cool.         You have hives, a rash or unusual itching.     Cardiovascular Clinic:   [address] [place] [address]. [place] [address]  Your Care Team:  EP Cardiology   Telephone Number     Eunice Le RN   [phone]     For scheduling appts or procedures:    Sonali Arboleda   [phone]     As always, Thank you for trusting us with your health care needs

## 2019-06-19 NOTE — Clinical Note
Arrived with Anesthesia, Electively intubated in room with Anesthesia, GA for procedure see Anesthesia note

## 2019-06-19 NOTE — ANESTHESIA POSTPROCEDURE EVALUATION
Anesthesia POST Procedure Evaluation    Patient: Bill Wadsworth   MRN:     [id number] Gender:   male   Age:    44 year old :      1975        Preoperative Diagnosis: ATRIAL FIBRILLATION   Procedure(s):  EP ABLATION FOCAL AFIB   Postop Comments: No value filed.       Anesthesia Type:  General  No value filed.    Reportable Event: NO     PAIN: Uncomplicated   Sign Out status: Comfortable, Well controlled pain     PONV: No PONV   Sign Out status:  No Nausea or Vomiting     Neuro/Psych: Uneventful perioperative course   Sign Out Status: Preoperative baseline; Age appropriate mentation     Airway/Resp.: Uneventful perioperative course   Sign Out Status: Non labored breathing, age appropriate RR; Resp. Status within EXPECTED Parameters     CV: Uneventful perioperative course   Sign Out status: Appropriate BP and perfusion indices; Appropriate HR/Rhythm     Disposition:   Sign Out in:  PACU  Disposition:  Phase II; Home  Recovery Course: Uneventful  Follow-Up: Not required           Last Anesthesia Record Vitals:  CRNA VITALS  2019 1403 - 2019 1503      2019             Pulse:  100    ART BP:  125/62    SpO2:  100 %    Resp Rate (observed):  16          Last PACU Vitals:  Vitals Value Taken Time   /87 2019  3:40 PM   Temp 36.9  C (98.5  F) 2019  3:25 PM   Pulse 90 2019  3:40 PM   Resp 20 2019  3:25 PM   SpO2 98 % 2019  3:30 PM   Temp src     NIBP     Pulse 100 2019  2:40 PM   SpO2 100 % 2019  2:40 PM   Resp     Temp     Ht Rate     Temp 2     Vitals shown include unvalidated device data.      Electronically Signed By: Sariah Tsang MD, 2019, 3:56 PM

## 2019-06-19 NOTE — ANESTHESIA PREPROCEDURE EVALUATION
Anesthesia Pre-Procedure Evaluation    Patient: Bill Wadsworth   MRN:     [id number] Gender:   male   Age:    44 year old :      1975        Preoperative Diagnosis: ATRIAL FIBRILLATION   Procedure(s):  EP ABLATION FOCAL AFIB     Past Medical History:   Diagnosis Date     Antiplatelet or antithrombotic long-term use      Arrhythmia     paroxysmal atrial fib     Hypertension goal BP (blood pressure) < 140/90      Pectoralis muscle rupture 2014    left      Past Surgical History:   Procedure Laterality Date     APPENDECTOMY OPEN       surgery      cheek bone surgery          Anesthesia Evaluation     . Pt has had prior anesthetic.     No history of anesthetic complications          ROS/MED HX    ENT/Pulmonary:  - neg pulmonary ROS    (-) tobacco use, asthma and COPD   Neurologic:  - neg neurologic ROS    (-) seizures, CVA and TIA   Cardiovascular:     (+) ----. Taking blood thinners : . . . :. dysrhythmias (paroxysmal afib) .       METS/Exercise Tolerance:  4 - Raking leaves, gardening   Hematologic:         Musculoskeletal:         GI/Hepatic:     (+) GERD      (-) liver disease   Renal/Genitourinary:      (-) renal disease   Endo:      (-) Type II DM   Psychiatric:         Infectious Disease:         Malignancy:         Other:                         PHYSICAL EXAM:   Mental Status/Neuro:    Airway: Facies: Feasible  Mallampati: Not Assessed  Mouth/Opening: Not Assessed  TM distance: Not Assessed  Neck ROM: Not Assessed   Respiratory: Auscultation: CTAB     Resp. Rate: Normal     Resp. Effort: Normal      CV: Rhythm: Regular  Heart: Normal Sounds   Comments:                    Lab Results   Component Value Date    WBC 8.1 2018    HGB 16.0 2018    HCT 45.3 2018     2018     2018    POTASSIUM 4.0 2018    CHLORIDE 104 2018    CO2 27 2018    BUN 16 2018    CR 1.21 2018    GLC 88 2018    SHAKIR 9.0 2018    ALBUMIN 4.3 2018  "   PROTTOTAL 8.1 11/29/2018    ALT 86 (H) 11/29/2018    AST 40 11/29/2018    ALKPHOS 95 11/29/2018    BILITOTAL 0.4 11/29/2018    TSH 1.73 11/29/2018       Preop Vitals  BP Readings from Last 3 Encounters:   06/19/19 130/89   06/18/19 121/80   05/23/19 (!) 147/93    Pulse Readings from Last 3 Encounters:   06/19/19 52   06/18/19 101   05/23/19 56      Resp Readings from Last 3 Encounters:   06/19/19 16   06/18/19 12   05/02/19 18    SpO2 Readings from Last 3 Encounters:   06/19/19 100%   06/18/19 96%   05/23/19 98%      Temp Readings from Last 1 Encounters:   06/19/19 36.9  C (98.5  F) (Oral)    Ht Readings from Last 1 Encounters:   06/19/19 1.753 m (5' 9\")      Wt Readings from Last 1 Encounters:   06/19/19 89.4 kg (197 lb 1.5 oz)    Estimated body mass index is 29.11 kg/m  as calculated from the following:    Height as of this encounter: 1.753 m (5' 9\").    Weight as of this encounter: 89.4 kg (197 lb 1.5 oz).     LDA:  Peripheral IV 06/18/19 Distal;Left Upper arm (Active)   Number of days: 1            Assessment:   ASA SCORE: 2       Documentation: H&P complete; Preop Testing complete; Consents complete   Proceeding: Proceed without further delay  Tobacco Use:  NO Active use of Tobacco/UNKNOWN Tobacco use status     Plan:   Anes. Type:  General   Pre-Induction: Midazolam IV   Induction:  IV (Standard)   Airway: Oral ETT   Access/Monitoring: PIV; A-Line   Maintenance: Balanced   Emergence: Procedure Site   Logistics: Same Day Surgery     Postop Pain/Sedation Strategy:  Standard-Options: Opioids PRN     PONV Management:  Adult Risk Factors:, Non-Smoker, Postop Opioids  Prevention: Ondansetron     CONSENT: Direct conversation   Plan and risks discussed with: Patient   Blood Products: Consented (ALL Blood Products)       Comments for Plan/Consent:  Airway exam:   MP: I  MO: >3 FB  TM: > 3 FB  ROM: Full  Impression: Feasible    No reported loose or chipped teeth    General with ETT. PIVx2, A-line. Standard ASA " monitors, invasive BP monitoring. IV opioids, IV and PO adjuncts as appropriate. IV antiemetics. PACU postop.    General anesthetic risks discussed included sore throat, voice hoarseness, injury to vocal cords, throat, mouth, teeth, tongue, and lips from intubation; nausea/vomiting; cardiac arrest, respiratory complications, MI, stroke, blood clots, death.     Transfusion risks discussed include infection, and complications involving the heart and lungs (transfusion reaction).  Arterial line risks discussed include bleeding/hematoma, vascular injury, nerve damage, blood clot, ischemia, loss of limb.    Presented opportunity to answer patient/family/POA/guardian questions. Questions addressed.      ###Note may not be generated accurately due to auto-population of certain parts of the note upon opening for editing###                           Gavin Mcmillan MD

## 2019-06-19 NOTE — PROGRESS NOTES
Electrophysiology Brief Progress note:  Patient is awake, alert, and feeling well. He would like to be discharged home tonight if he is able to meet discharge criteria listed below. He states that he does have someone who will be able to drive him home and stay with him overnight. Groin sites are dry and intact without lumps or bumps.     Discharge patient after patient is alert and tolerating oral liquids and foods, ambulating, urinating, incision site is stable (no bleeding and no hematoma) and patient has a . If Vital Signs are within the patient's normal limits (or SBP >90 mm Hg  and < 160 mm Hg).     Michaela Ann CNP

## 2019-06-20 LAB
INTERPRETATION ECG - MUSE: NORMAL
INTERPRETATION ECG - MUSE: NORMAL

## 2019-06-20 NOTE — PROGRESS NOTES
Patient is A+O. Groin site is WDL, no bleeding, no hematoma, CMS intact. Patient verbalized understanding of discharge instructions. Patient denies pain. Patient tolerated PO food and fluids, voided, and ambulated.

## 2019-06-20 NOTE — DISCHARGE SUMMARY
Pt ambulated without any issues. Pt's groin sites clean dry and intact-no bleeding or hematoma noted. Pt was not dizzy with walking. Pt spontaneously voided without any issues. Pt able to tolerate regular diet with no issues noted.    Discharge instructions went over with patient. All questions answered at this time. Patient had all belongings with them at time of discharge. PIV taken out before leaving floor. CN notified.

## 2019-06-24 ENCOUNTER — TELEPHONE (OUTPATIENT)
Dept: CARDIOLOGY | Facility: CLINIC | Age: 44
End: 2019-06-24

## 2019-06-24 DIAGNOSIS — R91.1 SOLITARY LUNG NODULE: Primary | ICD-10-CM

## 2019-06-24 NOTE — TELEPHONE ENCOUNTER
Spoke to patient.  S/p afib ablation.  States he is doing well and is ready to get back to work this week.  He is returning to work on Wed.  Reports the insertion site is healed.  Denies redness, pain, fever, drainage.  Patient is scheduled for a [facility] 2week heart monitor in 2 months and to see Dr. Dewitt in 3 months.    Kimberlee Galeano RN

## 2019-06-24 NOTE — TELEPHONE ENCOUNTER
----- Message from Ju Dewitt MD sent at 6/22/2019  1:10 PM CDT -----  Bi Mohan,    Please have pt f/u in lung nodule clinic re: lung nodule on CT.    Thanks,  LYC

## 2019-06-25 NOTE — TELEPHONE ENCOUNTER
Spoke to patient and reviewed Dr. Dewitt's recommendations.  Patient verbalized understanding and is requesting to go to the lung nodule clinic.  Referral placed and # given to schedule.  Kimberlee Galeano RN

## 2019-06-26 ENCOUNTER — PATIENT OUTREACH (OUTPATIENT)
Dept: PULMONOLOGY | Facility: CLINIC | Age: 44
End: 2019-06-26

## 2019-06-26 DIAGNOSIS — R91.8 PULMONARY NODULES: Primary | ICD-10-CM

## 2019-06-26 NOTE — PROGRESS NOTES
Informed patient he needs a Chest CT, scheduled for 7/1/19 at 10am.     Pelon Caballero RN   Pulmonary/CORE Care Coordinator  [facility]

## 2019-07-01 ENCOUNTER — OFFICE VISIT (OUTPATIENT)
Dept: PULMONOLOGY | Facility: CLINIC | Age: 44
End: 2019-07-01
Attending: INTERNAL MEDICINE
Payer: COMMERCIAL

## 2019-07-01 ENCOUNTER — ANCILLARY PROCEDURE (OUTPATIENT)
Dept: CT IMAGING | Facility: CLINIC | Age: 44
End: 2019-07-01
Attending: INTERNAL MEDICINE
Payer: COMMERCIAL

## 2019-07-01 VITALS
RESPIRATION RATE: 18 BRPM | DIASTOLIC BLOOD PRESSURE: 86 MMHG | OXYGEN SATURATION: 97 % | TEMPERATURE: 97.5 F | SYSTOLIC BLOOD PRESSURE: 130 MMHG | WEIGHT: 197 LBS | HEART RATE: 93 BPM | BODY MASS INDEX: 29.09 KG/M2

## 2019-07-01 DIAGNOSIS — R91.1 SOLITARY LUNG NODULE: ICD-10-CM

## 2019-07-01 DIAGNOSIS — R91.8 PULMONARY NODULES: ICD-10-CM

## 2019-07-01 PROCEDURE — 99204 OFFICE O/P NEW MOD 45 MIN: CPT | Performed by: INTERNAL MEDICINE

## 2019-07-01 PROCEDURE — 71250 CT THORAX DX C-: CPT | Performed by: RADIOLOGY

## 2019-07-01 ASSESSMENT — PAIN SCALES - GENERAL: PAINLEVEL: NO PAIN (0)

## 2019-07-01 NOTE — NURSING NOTE
Bill Wadsworth's goals for this visit include: Consult  He requests these members of his care team be copied on today's visit information: PCP    PCP: William Rosenberg    Referring Provider:  Ju Dewitt MD  [address] [place], [place] [address]  [place] [address]    /86   Pulse 93   Temp 97.5  F (36.4  C)   Resp 18   Wt 89.4 kg (197 lb)   SpO2 97%   BMI 29.09 kg/m      Do you need any medication refills at today's visit? N

## 2019-07-01 NOTE — PROGRESS NOTES
LUNG NODULE & INTERVENTIONAL PULMONARY CLINIC  [facility]     Bill Wadsworth MRN# [id number]   Age: 44 year old YOB: 1975     Reason for Consultation: lung nodule(s)    Requesting Physician: Ju Dewitt MD  [address] [place] [address] [id number]  [place] [address]       Assessment and Plan:    1. New multiple pulmonary lung nodule(s). Given the characteristics on current/previous imaging and risk factors; I would classify this to be Low (<6%) risk for cancer. Two 3mm nodules seen which were found incidentally through w/u afib. Given Fleischner criteria, no further surveillance required. He is agreeable.     Billing: The patient was seen and examined by me and the findings, assessment, and plan as documented was explained to the patient/family who expressed understand.     Chapincito Benton MD   of Medicine  Interventional Pulmonology  Department of Pulmonary, Allergy, Critical Care and Sleep Medicine   [facility]  Pager: [phone]          History:     Bill Wadsworth is a 44 year old male with sig h/o for afib who is here for evaluation/followup of lung nodule(s).    - No new resp sx or complaints. Denies dyspnea or cough.   - Had ablation for afib and CT showed incidental nodules.   - Personal hx of cancer: No.   - Family hx of cancer: NA   - Exposure hx: Denies asbestos or radon exposure   - Tobacco hx: Never smoker   - My interpretation of the images relevant for this visit includes: nodule   - My interpretation of the PFT's relevant for this visit includes: None     Culprit Nodule(s):   Multiple bilateral lung nodule(s) that are sub 3 mm. First seen by chest CT on 7/1/19. First observed on this date     Other active medical problems include:   - has afib s/p ablation. stable            Past Medical History:      Past Medical History:   Diagnosis Date     Antiplatelet or antithrombotic long-term use       Arrhythmia     paroxysmal atrial fib     Hypertension goal BP (blood pressure) < 140/90      Pectoralis muscle rupture 7/2014    left           Past Surgical History:      Past Surgical History:   Procedure Laterality Date     APPENDECTOMY OPEN  2004     EP ABLATION FOCAL AFIB N/A 6/19/2019    Procedure: EP ABLATION FOCAL AFIB;  Surgeon: Ju Dewitt MD;  Location:  HEART CARDIAC CATH LAB     surgery      cheek bone surgery          Social History:     Social History     Tobacco Use     Smoking status: Never Smoker     Smokeless tobacco: Never Used   Substance Use Topics     Alcohol use: Yes     Comment: 4-5 drinks per wk          Family History:     Family History   Problem Relation Age of Onset     Hypertension Mother      Cancer Paternal Grandmother      Cancer - colorectal Paternal Grandfather            Allergies:    No Known Allergies          Medications:     Current Outpatient Medications   Medication Sig     dabigatran ANTICOAGULANT (PRADAXA ANTICOAGULANT) 150 MG capsule Take 1 capsule (150 mg) by mouth 2 times daily Store in original 's bottle or blister pack; use within 120 days of opening.     No current facility-administered medications for this visit.           Review of Systems:     CONSTITUTIONAL: negative for fever, chills, change in weight  INTEGUMENTARY/SKIN: no rash or obvious new lesions  ENT/MOUTH: no sore throat, new sinus pain or nasal drainage  RESP: see interval history  CV: negative for chest pain, palpitations or peripheral edema  GI: no nausea, vomiting, change in stools  : no dysuria  MUSCULOSKELETAL: no myalgias, arthralgias  ENDOCRINE: blood sugars with adequate control  PSYCHIATRIC: mood stable  LYMPHATIC: no new lymphadenopathy  HEME: no bleeding or easy bruisability  NEURO: no numbness, weakness, headaches         Physical Exam:     Temp:  [97.5  F (36.4  C)] 97.5  F (36.4  C)  Pulse:  [93] 93  Resp:  [18] 18  BP: (130)/(86) 130/86  SpO2:  [97 %] 97 %  Wt  Readings from Last 4 Encounters:   07/01/19 89.4 kg (197 lb)   06/19/19 89.4 kg (197 lb 1.5 oz)   05/23/19 91.2 kg (201 lb)   05/02/19 87.5 kg (193 lb)     Constitutional:   Awake, alert and in no apparent distress     Eyes:   Nonicteric, SOFIA     ENT:    Trachea is midline. No gross neck abnormalities      Neck:   Supple without supraclavicular or cervical lymphadenopathy     Lungs:   Good air flow.  No crackles. No rhonchi.  No wheezes.     Cardiovascular:   Normal S1 and S2.  RRR.  No murmur, gallop or rub.  Radial, DP and PT pulses normal and symmetric     Abdomen:   NABS, soft, nontender, nondistended.  No HSM.     Musculoskeletal:   No edema.      Neurologic:   Alert and conversant. Cranial nerves  intact.       Skin:   Warm, dry.  No rash on limited exam.           Current Laboratory Data:   All laboratory and imaging data reviewed.           Previous Cardiology Imaging   No results found for this or any previous visit (from the past 8760 hour(s)).

## 2019-07-09 ENCOUNTER — ANCILLARY PROCEDURE (OUTPATIENT)
Dept: GENERAL RADIOLOGY | Facility: CLINIC | Age: 44
End: 2019-07-09
Attending: FAMILY MEDICINE
Payer: COMMERCIAL

## 2019-07-09 ENCOUNTER — OFFICE VISIT (OUTPATIENT)
Dept: FAMILY MEDICINE | Facility: CLINIC | Age: 44
End: 2019-07-09
Payer: COMMERCIAL

## 2019-07-09 VITALS
RESPIRATION RATE: 18 BRPM | DIASTOLIC BLOOD PRESSURE: 88 MMHG | WEIGHT: 199 LBS | SYSTOLIC BLOOD PRESSURE: 132 MMHG | BODY MASS INDEX: 29.39 KG/M2 | OXYGEN SATURATION: 96 % | HEART RATE: 97 BPM | TEMPERATURE: 98.3 F

## 2019-07-09 DIAGNOSIS — M79.645 PAIN OF FINGER OF LEFT HAND: ICD-10-CM

## 2019-07-09 DIAGNOSIS — M79.645 PAIN OF FINGER OF LEFT HAND: Primary | ICD-10-CM

## 2019-07-09 PROCEDURE — 73140 X-RAY EXAM OF FINGER(S): CPT | Mod: LT

## 2019-07-09 PROCEDURE — 99213 OFFICE O/P EST LOW 20 MIN: CPT | Performed by: FAMILY MEDICINE

## 2019-07-09 RX ORDER — CEPHALEXIN 500 MG/1
500 CAPSULE ORAL 2 TIMES DAILY
Qty: 10 CAPSULE | Refills: 1 | Status: SHIPPED | OUTPATIENT
Start: 2019-07-09 | End: 2019-07-14

## 2019-07-09 NOTE — PROGRESS NOTES
SUBJECTIVE:  Bill Wadsworth, a 44 year old male scheduled an appointment to discuss the following issues:  Silver in finger left 3rd. Building deck. right handed.  One week ago. Some pus/redness. No antibiotics. No fevers.   Medical, social, surgical, and family histories reviewed.    ROS:  All other ROS negative.  OBJECTIVE:  /88   Pulse 97   Temp 98.3  F (36.8  C) (Oral)   Resp 18   Wt 90.3 kg (199 lb)   SpO2 96%   BMI 29.39 kg/m    EXAM:  GENERAL APPEARANCE: healthy, alert and no distress  MS: extremities normal- no gross deformities noted, no evidence of inflammation in joints, FROM in all extremities.  SKIN: mild swelling/redness 3rd left finger palmar side near knuckle.  NEURO: Normal strength and tone, sensory exam grossly normal, mentation intact and speech normal  PSYCH: mentation appears normal and affect normal/bright    ASSESSMENT / PLAN:  (M79.615) Pain of finger of left hand  (primary encounter diagnosis)  Comment: I was able to pull a couple very small chet out but not sure if more are in. tdap up to date.   Plan: XR Finger Left G/E 2 Views, ORTHOPEDICS ADULT         REFERRAL, cephALEXin (KEFLEX) 500 MG capsule        Reveiwed risks and side effects of medication  Coban wrap daytime and air out/soak at night. Follow-up ortho if worse/not improving overall in next couple weeks. Expected course and warning signs reviewed. .Call/email with questions/concerns. Markedly worsening pain/swelling/redness or fevers to ER.   Await rad report.   Flash Ramírez MD

## 2019-07-10 ENCOUNTER — TELEPHONE (OUTPATIENT)
Dept: FAMILY MEDICINE | Facility: CLINIC | Age: 44
End: 2019-07-10

## 2019-07-10 NOTE — TELEPHONE ENCOUNTER
Pt notified of provider message as written.  Pt verbalized good understanding.  Pt states he has ortho appointment scheduled for tomorrow.  Isela WEIRN, RN

## 2019-07-10 NOTE — TELEPHONE ENCOUNTER
Please call patient. Xray read by radiology as possible very small foreign body still in finger (only 1mm length). Unless pain/swelling and RANGE OF MOTION improving patient should see ortho as discussed.   Patient:   Bill Wadsworth   [phone] (home)     Provider:   Flash Ramírez MD   Please call/evisit with questions or concerns

## 2019-07-11 ENCOUNTER — OFFICE VISIT (OUTPATIENT)
Dept: ORTHOPEDICS | Facility: CLINIC | Age: 44
End: 2019-07-11
Payer: COMMERCIAL

## 2019-07-11 VITALS
HEART RATE: 78 BPM | BODY MASS INDEX: 29.39 KG/M2 | SYSTOLIC BLOOD PRESSURE: 143 MMHG | DIASTOLIC BLOOD PRESSURE: 89 MMHG | OXYGEN SATURATION: 97 % | WEIGHT: 199 LBS

## 2019-07-11 DIAGNOSIS — S60.459A FOREIGN BODY OF FINGER: Primary | ICD-10-CM

## 2019-07-11 PROCEDURE — 99243 OFF/OP CNSLTJ NEW/EST LOW 30: CPT | Mod: 57 | Performed by: ORTHOPAEDIC SURGERY

## 2019-07-11 ASSESSMENT — PAIN SCALES - GENERAL: PAINLEVEL: NO PAIN (0)

## 2019-07-11 NOTE — LETTER
7/11/2019         RE: Bill Wadsworth  [address] [facility]  [place] [address]        Dear Colleague,    Thank you for referring your patient, Bill Wadsworth, to the [facility]. Please see a copy of my visit note below.    SUBJECTIVE:   iBll Wadsworth is a 44 year old male who is seen in consultation at the request of Dr. Ramírez for evaluation of left long finger pain that has been present approximately 2 weeks. No known injury. Patient wiped a piece of wood and a piece of wood punctured his long finger from the radial side; he was able to pull out most of the wood but a sliver broke off under the skin. He tried to get it out himself with a razor the first couple of days, was unsuccessful. Developed some redness with an infected appearance, but was able to mitigate the redness with soaks and neosporin ointment.    Present symptoms: pain and hard white line on volar 3rd finger where he thinks the splint is located.    Symptoms occur when: with palpation, finger flexion    Treatments tried to this point: removing the splinter himself and with his primary care physician, both attempts unsuccessful     Orthopedic PMH: none    Review of Systems:  Constitutional:  NEGATIVE for fever, chills, change in weight  Integumentary/Skin:  NEGATIVE for worrisome rashes, moles or lesions  Eyes:  NEGATIVE for vision changes or irritation  ENT/Mouth:  NEGATIVE for ear, mouth and throat problems  Resp:  NEGATIVE for significant cough or SOB  Breast:  NEGATIVE for masses, tenderness or discharge  CV:  NEGATIVE for chest pain, palpitations or peripheral edema  GI:  NEGATIVE for nausea, abdominal pain, heartburn, or change in bowel habits  :  Negative   Musculoskeletal:  See HPI above  Neuro:  NEGATIVE for weakness, dizziness or paresthesias  Endocrine:  NEGATIVE for temperature intolerance, skin/hair changes  Heme/allergy/immune:  NEGATIVE for bleeding problems  Psychiatric:  NEGATIVE for changes in mood or  affect    Past Medical History:   Past Medical History:   Diagnosis Date     Antiplatelet or antithrombotic long-term use      Arrhythmia     paroxysmal atrial fib     Hypertension goal BP (blood pressure) < 140/90      Pectoralis muscle rupture 7/2014    left     Past Surgical History:   Past Surgical History:   Procedure Laterality Date     APPENDECTOMY OPEN  2004     EP ABLATION FOCAL AFIB N/A 6/19/2019    Procedure: EP ABLATION FOCAL AFIB;  Surgeon: Ju Dewitt MD;  Location:  HEART CARDIAC CATH LAB     surgery      cheek bone surgery     Family History:   Family History   Problem Relation Age of Onset     Hypertension Mother      Cancer Paternal Grandmother      Cancer - colorectal Paternal Grandfather      Social History:   Social History     Tobacco Use     Smoking status: Never Smoker     Smokeless tobacco: Never Used   Substance Use Topics     Alcohol use: Yes     Comment: 4-5 drinks per wk     This document serves as a record of the services and decisions personally performed and made by BRITTANY Landry MD. It was created on his behalf by Tonya Redd, a trained medical scribe. The creation of this document is based the provider's statements to the medical scribe.    Scribe Tonya Redd 9:24 AM 7/11/2019        OBJECTIVE:  Physical Exam:  /89 (BP Location: Right arm, Patient Position: Sitting, Cuff Size: Adult Regular)   Pulse 78   Wt 90.3 kg (199 lb)   SpO2 97%   BMI 29.39 kg/m     General Appearance: healthy, alert and no distress   Skin: no suspicious lesions or rashes  Neuro: Normal strength and tone, mentation intact and speech normal  Vascular: good pulses, and capillary refill   Lymph: no lymphadenopathy   Psych:  mentation appears normal and affect normal/bright  Resp: no increased work of breathing     Left Hand Exam:  Inspection: slightly indurated area of the 3rd proximal phalanx area.   Although can't feel the fragment itself, there certainly is a well circumscribed area of  firmness.     X-rays:  None reviewed.      ASSESSMENT:   Encounter Diagnosis   Name Primary?     Foreign body of finger Yes        PLAN:   Due to limited time, and the fact that one exploration was not successful, we will explore the finger in surgery. Has scheduled a time for the procedure tomorrow, 7/12/2019. No H+P needed if only doing local anesthesia. Risks and benefits discussed today.     Return to clinic: 2 weeks after surgery.    The information in this document, created by a scribe for me, accurately reflects the services I personally performed and the decisions made by me. I have reviewed and approved this document for accuracy.        BRITTANY Landry MD  Dept. Orthopedic Surgery  [facility]           Again, thank you for allowing me to participate in the care of your patient.        Sincerely,        Jay Landry MD

## 2019-07-11 NOTE — PROGRESS NOTES
SUBJECTIVE:   Bill Wadsworth is a 44 year old male who is seen in consultation at the request of Dr. Ramírez for evaluation of left long finger pain that has been present approximately 2 weeks. No known injury. Patient wiped a piece of wood and a piece of wood punctured his long finger from the radial side; he was able to pull out most of the wood but a sliver broke off under the skin. He tried to get it out himself with a razor the first couple of days, was unsuccessful. Developed some redness with an infected appearance, but was able to mitigate the redness with soaks and neosporin ointment.    Present symptoms: pain and hard white line on volar 3rd finger where he thinks the splint is located.    Symptoms occur when: with palpation, finger flexion    Treatments tried to this point: removing the splinter himself and with his primary care physician, both attempts unsuccessful     Orthopedic PMH: none    Review of Systems:  Constitutional:  NEGATIVE for fever, chills, change in weight  Integumentary/Skin:  NEGATIVE for worrisome rashes, moles or lesions  Eyes:  NEGATIVE for vision changes or irritation  ENT/Mouth:  NEGATIVE for ear, mouth and throat problems  Resp:  NEGATIVE for significant cough or SOB  Breast:  NEGATIVE for masses, tenderness or discharge  CV:  NEGATIVE for chest pain, palpitations or peripheral edema  GI:  NEGATIVE for nausea, abdominal pain, heartburn, or change in bowel habits  :  Negative   Musculoskeletal:  See HPI above  Neuro:  NEGATIVE for weakness, dizziness or paresthesias  Endocrine:  NEGATIVE for temperature intolerance, skin/hair changes  Heme/allergy/immune:  NEGATIVE for bleeding problems  Psychiatric:  NEGATIVE for changes in mood or affect    Past Medical History:   Past Medical History:   Diagnosis Date     Antiplatelet or antithrombotic long-term use      Arrhythmia     paroxysmal atrial fib     Hypertension goal BP (blood pressure) < 140/90      Pectoralis muscle rupture 7/2014     left     Past Surgical History:   Past Surgical History:   Procedure Laterality Date     APPENDECTOMY OPEN  2004     EP ABLATION FOCAL AFIB N/A 6/19/2019    Procedure: EP ABLATION FOCAL AFIB;  Surgeon: Ju Dewitt MD;  Location:  HEART CARDIAC CATH LAB     surgery      cheek bone surgery     Family History:   Family History   Problem Relation Age of Onset     Hypertension Mother      Cancer Paternal Grandmother      Cancer - colorectal Paternal Grandfather      Social History:   Social History     Tobacco Use     Smoking status: Never Smoker     Smokeless tobacco: Never Used   Substance Use Topics     Alcohol use: Yes     Comment: 4-5 drinks per wk     This document serves as a record of the services and decisions personally performed and made by BRITTANY Landry MD. It was created on his behalf by Tonya Redd, a trained medical scribe. The creation of this document is based the provider's statements to the medical scribe.    Arias Redd 9:24 AM 7/11/2019        OBJECTIVE:  Physical Exam:  /89 (BP Location: Right arm, Patient Position: Sitting, Cuff Size: Adult Regular)   Pulse 78   Wt 90.3 kg (199 lb)   SpO2 97%   BMI 29.39 kg/m    General Appearance: healthy, alert and no distress   Skin: no suspicious lesions or rashes  Neuro: Normal strength and tone, mentation intact and speech normal  Vascular: good pulses, and capillary refill   Lymph: no lymphadenopathy   Psych:  mentation appears normal and affect normal/bright  Resp: no increased work of breathing     Left Hand Exam:  Inspection: slightly indurated area of the 3rd proximal phalanx area.   Although can't feel the fragment itself, there certainly is a well circumscribed area of firmness.     X-rays:  None reviewed.      ASSESSMENT:   Encounter Diagnosis   Name Primary?     Foreign body of finger Yes        PLAN:   Due to limited time, and the fact that one exploration was not successful, we will explore the finger in surgery. Has  scheduled a time for the procedure tomorrow, 7/12/2019. No H+P needed if only doing local anesthesia. Risks and benefits discussed today.     Return to clinic: 2 weeks after surgery.    The information in this document, created by a scribe for me, accurately reflects the services I personally performed and the decisions made by me. I have reviewed and approved this document for accuracy.        BRITTANY Landry MD  Dept. Orthopedic Surgery  [facility]

## 2019-07-12 ENCOUNTER — HOSPITAL ENCOUNTER (OUTPATIENT)
Facility: AMBULATORY SURGERY CENTER | Age: 44
Discharge: HOME | End: 2019-07-12
Attending: ORTHOPAEDIC SURGERY | Admitting: ORTHOPAEDIC SURGERY
Payer: COMMERCIAL

## 2019-07-12 VITALS
DIASTOLIC BLOOD PRESSURE: 82 MMHG | HEART RATE: 87 BPM | RESPIRATION RATE: 16 BRPM | SYSTOLIC BLOOD PRESSURE: 123 MMHG | OXYGEN SATURATION: 98 % | TEMPERATURE: 97.9 F

## 2019-07-12 DIAGNOSIS — S60.459A FOREIGN BODY OF FINGER: Primary | ICD-10-CM

## 2019-07-12 PROCEDURE — G8918 PT W/O PREOP ORDER IV AB PRO: HCPCS

## 2019-07-12 PROCEDURE — 10120 INC&RMVL FB SUBQ TISS SMPL: CPT | Mod: F2 | Performed by: ORTHOPAEDIC SURGERY

## 2019-07-12 PROCEDURE — 10120 INC&RMVL FB SUBQ TISS SMPL: CPT

## 2019-07-12 PROCEDURE — G8907 PT DOC NO EVENTS ON DISCHARG: HCPCS

## 2019-07-12 RX ORDER — HYDROCODONE BITARTRATE AND ACETAMINOPHEN 5; 325 MG/1; MG/1
1-2 TABLET ORAL EVERY 4 HOURS PRN
Qty: 10 TABLET | Refills: 0 | Status: SHIPPED | OUTPATIENT
Start: 2019-07-12 | End: 2024-03-18

## 2019-07-12 RX ORDER — ACETAMINOPHEN 325 MG/1
975 TABLET ORAL ONCE
Status: COMPLETED | OUTPATIENT
Start: 2019-07-12 | End: 2019-07-12

## 2019-07-12 RX ORDER — ACETAMINOPHEN 325 MG/1
650 TABLET ORAL
Status: DISCONTINUED | OUTPATIENT
Start: 2019-07-12 | End: 2019-07-13 | Stop reason: HOSPADM

## 2019-07-12 RX ORDER — BUPIVACAINE HYDROCHLORIDE 2.5 MG/ML
INJECTION, SOLUTION INFILTRATION; PERINEURAL PRN
Status: DISCONTINUED | OUTPATIENT
Start: 2019-07-12 | End: 2019-07-12 | Stop reason: HOSPADM

## 2019-07-12 RX ADMIN — ACETAMINOPHEN 975 MG: 325 TABLET ORAL at 10:47

## 2019-07-12 NOTE — OP NOTE
OPERATIVE NOTE:    PRE-OP DIAGNOSIS: Left third finger foreign body     POST-OP DIAGNOSIS: Left third finger foreign body    PROCEDURE: Left third finger foreign body removal    SURGEON: Frantz    ANESTHESIA: Local    INDICATIONS: Patient is a 44-year-old male who was working with wood and a large sliver of wood impaled skin of the left third finger.  In trying to remove it a portion broke off and he believes that there is a retained piece of wood in the left third finger.  Attempts at finding this piece of wood were unsuccessful when performed by the patient and in the office.  Informed consent was obtained for the procedure    PROCEDURE: He was taken to the operating room placed on the operating table supine position.  Limb was prepped and draped in usual sterile fashion.  Pause for site confirmation performed.  Local anesthetic using quarter percent Marcaine and 1% lidocaine were injected to achieve a successful digital nerve block of the third finger.    Finger tourniquet was used and a 1-1/2 cm incision was made obliquely in the area where we believe the sliver to be.  Once through the dermal layer was used a spreading technique and quickly found a 1 cm wood sliver which was removed.  I explored the area further and did not find any further wood fragments.  The wound was irrigated and the tourniquet deflated.  No major bleeders were encountered.  The wound was closed with interrupted 5-0 nylon sutures placed in vertical mattress fashion with 1 simple suture placed as well.  Sterile dressings were applied and he was taken back to phase 2.    ESTIMATED BLOOD LOSS:  5 cc    Drains: None    SPECIMEN: None    COMPLICATIONS: None    PLAN: Routine wound care      BRITTANY Landry MD  Dept. Orthopedic Surgery  [facility]

## 2019-07-12 NOTE — PROGRESS NOTES
Patient instructed by Dr Landry and by nurse to  antibiotic that primary MD prescribed and take as prescribed

## 2019-07-26 ENCOUNTER — ALLIED HEALTH/NURSE VISIT (OUTPATIENT)
Dept: CARDIOLOGY | Facility: CLINIC | Age: 44
End: 2019-07-26
Attending: INTERNAL MEDICINE
Payer: COMMERCIAL

## 2019-07-26 DIAGNOSIS — I48.0 PAROXYSMAL ATRIAL FIBRILLATION (H): ICD-10-CM

## 2019-07-26 PROCEDURE — 0298T ZZC EXT ECG > 48HR TO 21 DAY REVIEW AND INTERPRETATN: CPT | Performed by: INTERNAL MEDICINE

## 2019-09-23 ENCOUNTER — OFFICE VISIT (OUTPATIENT)
Dept: CARDIOLOGY | Facility: CLINIC | Age: 44
End: 2019-09-23
Payer: COMMERCIAL

## 2019-09-23 VITALS
HEART RATE: 87 BPM | WEIGHT: 198 LBS | BODY MASS INDEX: 29.24 KG/M2 | OXYGEN SATURATION: 97 % | SYSTOLIC BLOOD PRESSURE: 127 MMHG | DIASTOLIC BLOOD PRESSURE: 84 MMHG

## 2019-09-23 DIAGNOSIS — I48.0 PAROXYSMAL ATRIAL FIBRILLATION (H): Primary | ICD-10-CM

## 2019-09-23 PROCEDURE — 99213 OFFICE O/P EST LOW 20 MIN: CPT | Performed by: INTERNAL MEDICINE

## 2019-09-23 NOTE — PROGRESS NOTES
Visit Date:   2019      PURPOSE OF VISIT:  The patient comes back for followup after atrial fibrillation ablation.      HISTORY OF PRESENT ILLNESS:  The patient is a 44-year-old gentleman whom I have been following for paroxysmal atrial fibrillation.  On 2019, the patient underwent successful circumferential pulmonary vein isolation for atrial fibrillation.  In the last 3 months after the ablation, the patient has not had any recurrent atrial fibrillation episode when previously he was having daily episodes.  A ZIO Patch monitor also showed absence of atrial fibrillation episodes.      Overall, the patient is very pleased with the results of the ablation.  He denies any symptoms of palpitations, exertional dyspnea, exertional angina, frequent lightheadedness, presyncope or syncope.  He is no longer taking his Pradaxa.  The patient does not have any risk factors for stroke.      ASSESSMENT AND PLAN:  Status post successful ablation for paroxysmal atrial fibrillation.      It is encouraging that the patient is doing very well after the ablation.  We will see the patient again in clinic in approximately 6 months.  All questions and concerns were addressed, and patient is happy with plan.  The plan has also been communicated to the patient's primary care provider.         BENNY BARRERA MD             D: 2019   T: 2019   MT: VALENTÍN      Name:     ZEESHAN ALANIS   MRN:      [id number]        Account:      [id number]   :      1975           Visit Date:   2019      Document: [id number]

## 2019-09-23 NOTE — Clinical Note
9/23/2019      RE: Bill Wadsworth  [address] [facility] [address]  [place] [address]       Dear Colleague,    Thank you for the opportunity to participate in the care of your patient, Bill Wadsworth, at the [facility] AT [facility]. Please see a copy of my visit note below.    HPI: see dictated note    PAST MEDICAL HISTORY:  Past Medical History:   Diagnosis Date     Antiplatelet or antithrombotic long-term use      Arrhythmia     paroxysmal atrial fib     Hypertension goal BP (blood pressure) < 140/90      Pectoralis muscle rupture 7/2014    left       CURRENT MEDICATIONS:  Current Outpatient Medications   Medication Sig Dispense Refill     dabigatran ANTICOAGULANT (PRADAXA ANTICOAGULANT) 150 MG capsule Take 1 capsule (150 mg) by mouth 2 times daily Store in original 's bottle or blister pack; use within 120 days of opening. (Patient not taking: Reported on 9/23/2019) 60 capsule 3     HYDROcodone-acetaminophen (NORCO) 5-325 MG tablet Take 1-2 tablets by mouth every 4 hours as needed for moderate to severe pain (Patient not taking: Reported on 9/23/2019) 10 tablet 0       PAST SURGICAL HISTORY:  Past Surgical History:   Procedure Laterality Date     APPENDECTOMY OPEN  2004     EP ABLATION FOCAL AFIB N/A 6/19/2019    Procedure: EP ABLATION FOCAL AFIB;  Surgeon: Ju Dewitt MD;  Location:  HEART CARDIAC CATH LAB     REMOVE FOREIGN BODY FINGER Left 7/12/2019    Procedure: REMOVAL, FOREIGN BODY, LEFT 3RD FINGER;  Surgeon: Jay Landry MD;  Location: [facility] OR     surgery      cheek bone surgery       ALLERGIES:   No Known Allergies    FAMILY HISTORY:  - Premature coronary artery disease  - Atrial fibrillation  - Sudden cardiac death     SOCIAL HISTORY:  Social History     Tobacco Use     Smoking status: Never Smoker     Smokeless tobacco: Never Used   Substance Use Topics     Alcohol use: Yes     Comment: 4-5 drinks per  wk     Drug use: No       ROS:   Constitutional: No fever, chills, or sweats. Weight stable.   ENT: No visual disturbance, ear ache, epistaxis, sore throat.   Cardiovascular: As per HPI.   Respiratory: No cough, hemoptysis.    GI: No nausea, vomiting, hematemesis, melena, or hematochezia.   : No hematuria.   Integument: Negative.   Psychiatric: Negative.   Hematologic:  Easy bruising, no easy bleeding.  Neuro: Negative.   Endocrinology: No significant heat or cold intolerance   Musculoskeletal: No myalgia.    Exam:  /84 (BP Location: Right arm, Patient Position: Chair, Cuff Size: Adult Regular)   Pulse 87   Wt 89.8 kg (198 lb)   SpO2 97%   BMI 29.24 kg/m     GENERAL APPEARANCE: healthy, alert and no distress  HEENT: no icterus, no xanthelasmas, normal pupil size and reaction, normal palate, mucosa moist, no central cyanosis  NECK: no adenopathy, no asymmetry, masses, or scars, thyroid normal to palpation and no bruits, JVP not elevated  RESPIRATORY: lungs clear to auscultation - no rales, rhonchi or wheezes, no use of accessory muscles, no retractions, respirations are unlabored, normal respiratory rate  CARDIOVASCULAR: regular rhythm, normal S1 with physiologic split S2, no S3 or S4 and no murmur, click or rub, precordium quiet with normal PMI.  ABDOMEN: soft, non tender, without hepatosplenomegaly, no masses palpable, bowel sounds normal, aorta not enlarged by palpation, no abdominal bruits  EXTREMITIES: peripheral pulses normal, no edema, no bruits  NEURO: alert and oriented to person/place/time, normal speech, gait and affect  VASC: Radial, femoral, dorsalis pedis and posterior tibialis pulses are normal in volumes and symmetric bilaterally. No bruits are heard.  SKIN: no ecchymoses, no rashes    Labs:  CBC RESULTS:   Lab Results   Component Value Date    WBC 5.1 06/19/2019    RBC 5.00 06/19/2019    HGB 14.9 06/19/2019    HCT 45.4 06/19/2019    MCV 91 06/19/2019    MCH 29.8 06/19/2019    MCHC 32.8  06/19/2019    RDW 12.8 06/19/2019     06/19/2019       BMP RESULTS:  Lab Results   Component Value Date     06/19/2019    POTASSIUM 4.4 06/19/2019    CHLORIDE 109 06/19/2019    CO2 27 06/19/2019    ANIONGAP 5 06/19/2019     (H) 06/19/2019    BUN 16 06/19/2019    CR 1.10 06/19/2019    GFRESTIMATED 82 06/19/2019    GFRESTBLACK >90 06/19/2019    SHAKIR 8.5 06/19/2019        INR RESULTS:  No results found for: INR    Procedures:      Assessment and Plan: see dictated note      CC  Patient Care Team:  Jerome Farah MD as PCP - General (Family Practice)  Oppel, Arun Jha PA-C as Assigned PCP  JEROME FARAH      Visit Date:   09/23/2019      PURPOSE OF VISIT:  The patient comes back for followup after atrial fibrillation ablation.      HISTORY OF PRESENT ILLNESS:  The patient is a 44-year-old gentleman whom I have been following for paroxysmal atrial fibrillation.  On 06/19/2019, the patient underwent successful circumferential pulmonary vein isolation for atrial fibrillation.  In the last 3 months after the ablation, the patient has not had any recurrent atrial fibrillation episode when previously he was having daily episodes.  A ZIO Patch monitor also showed absence of atrial fibrillation episodes.      Overall, the patient is very pleased with the results of the ablation.  He denies any symptoms of palpitations, exertional dyspnea, exertional angina, frequent lightheadedness, presyncope or syncope.  He is no longer taking his Pradaxa.  The patient does not have any risk factors for stroke.      ASSESSMENT AND PLAN:  Status post successful ablation for paroxysmal atrial fibrillation.      It is encouraging that the patient is doing very well after the ablation.  We will see the patient again in clinic in approximately 6 months.  All questions and concerns were addressed, and patient is happy with plan.  The plan has also been communicated to the patient's primary care provider.         BENNY GARCIAS  MD HOLLY             D: 2019   T: 2019   MT: WT      Name:     ZEESHAN ALANIS   MRN:      [id number]        Account:      [id number]   :      1975           Visit Date:   2019      Document: [id number]       Please do not hesitate to contact me if you have any questions/concerns.     Sincerely,     Ju Dewitt MD

## 2019-09-23 NOTE — PROGRESS NOTES
HPI: see dictated note    PAST MEDICAL HISTORY:  Past Medical History:   Diagnosis Date     Antiplatelet or antithrombotic long-term use      Arrhythmia     paroxysmal atrial fib     Hypertension goal BP (blood pressure) < 140/90      Pectoralis muscle rupture 7/2014    left       CURRENT MEDICATIONS:  Current Outpatient Medications   Medication Sig Dispense Refill     dabigatran ANTICOAGULANT (PRADAXA ANTICOAGULANT) 150 MG capsule Take 1 capsule (150 mg) by mouth 2 times daily Store in original 's bottle or blister pack; use within 120 days of opening. (Patient not taking: Reported on 9/23/2019) 60 capsule 3     HYDROcodone-acetaminophen (NORCO) 5-325 MG tablet Take 1-2 tablets by mouth every 4 hours as needed for moderate to severe pain (Patient not taking: Reported on 9/23/2019) 10 tablet 0       PAST SURGICAL HISTORY:  Past Surgical History:   Procedure Laterality Date     APPENDECTOMY OPEN  2004     EP ABLATION FOCAL AFIB N/A 6/19/2019    Procedure: EP ABLATION FOCAL AFIB;  Surgeon: Ju Dewitt MD;  Location:  HEART CARDIAC CATH LAB     REMOVE FOREIGN BODY FINGER Left 7/12/2019    Procedure: REMOVAL, FOREIGN BODY, LEFT 3RD FINGER;  Surgeon: Jay Landry MD;  Location: [facility] OR     surgery      cheek bone surgery       ALLERGIES:   No Known Allergies    FAMILY HISTORY:  - Premature coronary artery disease  - Atrial fibrillation  - Sudden cardiac death     SOCIAL HISTORY:  Social History     Tobacco Use     Smoking status: Never Smoker     Smokeless tobacco: Never Used   Substance Use Topics     Alcohol use: Yes     Comment: 4-5 drinks per wk     Drug use: No       ROS:   Constitutional: No fever, chills, or sweats. Weight stable.   ENT: No visual disturbance, ear ache, epistaxis, sore throat.   Cardiovascular: As per HPI.   Respiratory: No cough, hemoptysis.    GI: No nausea, vomiting, hematemesis, melena, or hematochezia.   : No hematuria.   Integument: Negative.   Psychiatric:  Negative.   Hematologic:  Easy bruising, no easy bleeding.  Neuro: Negative.   Endocrinology: No significant heat or cold intolerance   Musculoskeletal: No myalgia.    Exam:  /84 (BP Location: Right arm, Patient Position: Chair, Cuff Size: Adult Regular)   Pulse 87   Wt 89.8 kg (198 lb)   SpO2 97%   BMI 29.24 kg/m    GENERAL APPEARANCE: healthy, alert and no distress  HEENT: no icterus, no xanthelasmas, normal pupil size and reaction, normal palate, mucosa moist, no central cyanosis  NECK: no adenopathy, no asymmetry, masses, or scars, thyroid normal to palpation and no bruits, JVP not elevated  RESPIRATORY: lungs clear to auscultation - no rales, rhonchi or wheezes, no use of accessory muscles, no retractions, respirations are unlabored, normal respiratory rate  CARDIOVASCULAR: regular rhythm, normal S1 with physiologic split S2, no S3 or S4 and no murmur, click or rub, precordium quiet with normal PMI.  ABDOMEN: soft, non tender, without hepatosplenomegaly, no masses palpable, bowel sounds normal, aorta not enlarged by palpation, no abdominal bruits  EXTREMITIES: peripheral pulses normal, no edema, no bruits  NEURO: alert and oriented to person/place/time, normal speech, gait and affect  VASC: Radial, femoral, dorsalis pedis and posterior tibialis pulses are normal in volumes and symmetric bilaterally. No bruits are heard.  SKIN: no ecchymoses, no rashes    Labs:  CBC RESULTS:   Lab Results   Component Value Date    WBC 5.1 06/19/2019    RBC 5.00 06/19/2019    HGB 14.9 06/19/2019    HCT 45.4 06/19/2019    MCV 91 06/19/2019    MCH 29.8 06/19/2019    MCHC 32.8 06/19/2019    RDW 12.8 06/19/2019     06/19/2019       BMP RESULTS:  Lab Results   Component Value Date     06/19/2019    POTASSIUM 4.4 06/19/2019    CHLORIDE 109 06/19/2019    CO2 27 06/19/2019    ANIONGAP 5 06/19/2019     (H) 06/19/2019    BUN 16 06/19/2019    CR 1.10 06/19/2019    GFRESTIMATED 82 06/19/2019    GFRESTBLACK >90  06/19/2019    SHAKIR 8.5 06/19/2019        INR RESULTS:  No results found for: INR    Procedures:      Assessment and Plan: see dictated note      CC  Patient Care Team:  William Farah MD as PCP - General (Family Practice)  Oppel, Arun Jha PA-C as Assigned PCP  WILLIAM FARAH

## 2019-09-23 NOTE — LETTER
9/23/2019       RE: Bill Wadsworth  [address] [facility] [address]  [place] [address]     Dear Colleague,    Thank you for referring your patient, Bill Wadsworth, to the [facility] AT [place] [facility] at [facility]. Please see a copy of my visit note below.    HPI: see dictated note    PAST MEDICAL HISTORY:  Past Medical History:   Diagnosis Date     Antiplatelet or antithrombotic long-term use      Arrhythmia     paroxysmal atrial fib     Hypertension goal BP (blood pressure) < 140/90      Pectoralis muscle rupture 7/2014    left       CURRENT MEDICATIONS:  Current Outpatient Medications   Medication Sig Dispense Refill     dabigatran ANTICOAGULANT (PRADAXA ANTICOAGULANT) 150 MG capsule Take 1 capsule (150 mg) by mouth 2 times daily Store in original 's bottle or blister pack; use within 120 days of opening. (Patient not taking: Reported on 9/23/2019) 60 capsule 3     HYDROcodone-acetaminophen (NORCO) 5-325 MG tablet Take 1-2 tablets by mouth every 4 hours as needed for moderate to severe pain (Patient not taking: Reported on 9/23/2019) 10 tablet 0       PAST SURGICAL HISTORY:  Past Surgical History:   Procedure Laterality Date     APPENDECTOMY OPEN  2004     EP ABLATION FOCAL AFIB N/A 6/19/2019    Procedure: EP ABLATION FOCAL AFIB;  Surgeon: Ju Dewitt MD;  Location:  HEART CARDIAC CATH LAB     REMOVE FOREIGN BODY FINGER Left 7/12/2019    Procedure: REMOVAL, FOREIGN BODY, LEFT 3RD FINGER;  Surgeon: Jay Landry MD;  Location: [facility] OR     surgery      cheek bone surgery       ALLERGIES:   No Known Allergies    FAMILY HISTORY:  - Premature coronary artery disease  - Atrial fibrillation  - Sudden cardiac death     SOCIAL HISTORY:  Social History     Tobacco Use     Smoking status: Never Smoker     Smokeless tobacco: Never Used   Substance Use Topics     Alcohol use: Yes     Comment: 4-5 drinks per wk     Drug use: No       ROS:    Constitutional: No fever, chills, or sweats. Weight stable.   ENT: No visual disturbance, ear ache, epistaxis, sore throat.   Cardiovascular: As per HPI.   Respiratory: No cough, hemoptysis.    GI: No nausea, vomiting, hematemesis, melena, or hematochezia.   : No hematuria.   Integument: Negative.   Psychiatric: Negative.   Hematologic:  Easy bruising, no easy bleeding.  Neuro: Negative.   Endocrinology: No significant heat or cold intolerance   Musculoskeletal: No myalgia.    Exam:  /84 (BP Location: Right arm, Patient Position: Chair, Cuff Size: Adult Regular)   Pulse 87   Wt 89.8 kg (198 lb)   SpO2 97%   BMI 29.24 kg/m     GENERAL APPEARANCE: healthy, alert and no distress  HEENT: no icterus, no xanthelasmas, normal pupil size and reaction, normal palate, mucosa moist, no central cyanosis  NECK: no adenopathy, no asymmetry, masses, or scars, thyroid normal to palpation and no bruits, JVP not elevated  RESPIRATORY: lungs clear to auscultation - no rales, rhonchi or wheezes, no use of accessory muscles, no retractions, respirations are unlabored, normal respiratory rate  CARDIOVASCULAR: regular rhythm, normal S1 with physiologic split S2, no S3 or S4 and no murmur, click or rub, precordium quiet with normal PMI.  ABDOMEN: soft, non tender, without hepatosplenomegaly, no masses palpable, bowel sounds normal, aorta not enlarged by palpation, no abdominal bruits  EXTREMITIES: peripheral pulses normal, no edema, no bruits  NEURO: alert and oriented to person/place/time, normal speech, gait and affect  VASC: Radial, femoral, dorsalis pedis and posterior tibialis pulses are normal in volumes and symmetric bilaterally. No bruits are heard.  SKIN: no ecchymoses, no rashes    Labs:  CBC RESULTS:   Lab Results   Component Value Date    WBC 5.1 06/19/2019    RBC 5.00 06/19/2019    HGB 14.9 06/19/2019    HCT 45.4 06/19/2019    MCV 91 06/19/2019    MCH 29.8 06/19/2019    MCHC 32.8 06/19/2019    RDW 12.8 06/19/2019      06/19/2019       BMP RESULTS:  Lab Results   Component Value Date     06/19/2019    POTASSIUM 4.4 06/19/2019    CHLORIDE 109 06/19/2019    CO2 27 06/19/2019    ANIONGAP 5 06/19/2019     (H) 06/19/2019    BUN 16 06/19/2019    CR 1.10 06/19/2019    GFRESTIMATED 82 06/19/2019    GFRESTBLACK >90 06/19/2019    SHAKIR 8.5 06/19/2019        INR RESULTS:  No results found for: INR    Procedures:      Assessment and Plan: see dictated note      CC  Patient Care Team:  Jerome Farah MD as PCP - General (Family Practice)  Oppel, Arun Jha PA-C as Assigned PCP  JEROME FARAH      Visit Date:   09/23/2019      PURPOSE OF VISIT:  The patient comes back for followup after atrial fibrillation ablation.      HISTORY OF PRESENT ILLNESS:  The patient is a 44-year-old gentleman whom I have been following for paroxysmal atrial fibrillation.  On 06/19/2019, the patient underwent successful circumferential pulmonary vein isolation for atrial fibrillation.  In the last 3 months after the ablation, the patient has not had any recurrent atrial fibrillation episode when previously he was having daily episodes.  A ZIO Patch monitor also showed absence of atrial fibrillation episodes.      Overall, the patient is very pleased with the results of the ablation.  He denies any symptoms of palpitations, exertional dyspnea, exertional angina, frequent lightheadedness, presyncope or syncope.  He is no longer taking his Pradaxa.  The patient does not have any risk factors for stroke.      ASSESSMENT AND PLAN:  Status post successful ablation for paroxysmal atrial fibrillation.      It is encouraging that the patient is doing very well after the ablation.  We will see the patient again in clinic in approximately 6 months.  All questions and concerns were addressed, and patient is happy with plan.  The plan has also been communicated to the patient's primary care provider.         BENNY BARRERA MD             D: 09/23/2019    T: 2019   MT: WT      Name:     ZEESHAN ALANIS   MRN:      [id number]        Account:      [id number]   :      1975           Visit Date:   2019      Document: [id number]

## 2019-09-23 NOTE — PATIENT INSTRUCTIONS
Thank you for coming to the [facility] @ [facility]; please note the following instructions:    1. Dr. Ju Dewitt has requested you to follow up in 6 months          If you have any questions regarding your visit please contact your care team:     Cardiology  Telephone Number   Kimberlee GIBSON, RN  Madeline HERNADEZ,RN  Suyaap MYERS, WALTER LOGAN, MA  Jorge LIVINGSTON, LPN   [phone]    *After hours: [phone]   For scheduling appts:     [phone] or    [phone] (select option 1)    *After hours: [phone]     For the Device Clinic (Pacemakers and ICD's)  RN's :  Gaby Boggs   During business hours: [phone]    *After business hours:  [phone] (select option 4)      Normal test result notifications will be released via [employer] or mailed within 7 business days.  All other test results, will be communicated via telephone once reviewed by your cardiologist.    If you need a medication refill please contact your pharmacy.  Please allow 3 business days for your refill to be completed.    As always, thank you for trusting us with your health care needs!

## 2019-09-23 NOTE — NURSING NOTE
"Chief Complaint   Patient presents with     RECHECK     OV with Dr. Ju Dewitt for June Angiogram follow up for Paroxysmal atrial fibrillation, July Zio. Pt reports no cardiac symptoms.       Initial /84 (BP Location: Right arm, Patient Position: Chair, Cuff Size: Adult Regular)   Pulse 87   Wt 89.8 kg (198 lb)   SpO2 97%   BMI 29.24 kg/m   Estimated body mass index is 29.24 kg/m  as calculated from the following:    Height as of 6/19/19: 1.753 m (5' 9\").    Weight as of this encounter: 89.8 kg (198 lb)..  BP completed using cuff size: regular    Shantal Alvarez MA    "

## 2020-03-01 ENCOUNTER — HEALTH MAINTENANCE LETTER (OUTPATIENT)
Age: 45
End: 2020-03-01

## 2020-04-27 ENCOUNTER — VIRTUAL VISIT (OUTPATIENT)
Dept: CARDIOLOGY | Facility: CLINIC | Age: 45
End: 2020-04-27
Attending: INTERNAL MEDICINE
Payer: COMMERCIAL

## 2020-04-27 DIAGNOSIS — Z86.79 S/P ABLATION OF ATRIAL FIBRILLATION: Primary | ICD-10-CM

## 2020-04-27 DIAGNOSIS — Z98.890 S/P ABLATION OF ATRIAL FIBRILLATION: Primary | ICD-10-CM

## 2020-04-27 DIAGNOSIS — I48.0 PAROXYSMAL ATRIAL FIBRILLATION (H): ICD-10-CM

## 2020-04-27 PROCEDURE — 99213 OFFICE O/P EST LOW 20 MIN: CPT | Mod: 95 | Performed by: INTERNAL MEDICINE

## 2020-04-27 NOTE — PATIENT INSTRUCTIONS
Thank you for coming to the [facility] @ [place]; please note the following instructions:    1.  FOLLOW UP IN 1 YEAR.  WE WILL CONTACT YOU WHEN THE TIME GETS CLOSER        If you have any questions regarding your visit please contact your care team:     Cardiology  Telephone Number   Kimberlee GIBSON, RN  Madeline HERNADEZ,RN  Suyapa MYERS, WALTER LOGAN, MA  Jorge LIVINGSTON, LPN   [phone]   (select option 1)    *After hours: [phone]     For scheduling appts:     [phone] or    [phone] (select option 1)    *After hours: [phone]     For the Device Clinic (Pacemakers and ICD's)  RN's :  Gaby Boggs   During business hours: [phone]    *After business hours:  [phone] (select option 4)      Normal test result notifications will be released via [employer] or mailed within 7 business days.  All other test results, will be communicated via telephone once reviewed by your cardiologist.    If you need a medication refill please contact your pharmacy.  Please allow 3 business days for your refill to be completed.    As always, thank you for trusting us with your health care needs!

## 2020-04-27 NOTE — PROGRESS NOTES
"Bill Wadsworth is a 45 year old male who is being evaluated via a billable video visit.      The patient has been notified of following:     \"This video visit will be conducted via a call between you and your physician/provider. We have found that certain health care needs can be provided without the need for an in-person physical exam.  This service lets us provide the care you need with a video conversation.  If a prescription is necessary we can send it directly to your pharmacy.  If lab work is needed we can place an order for that and you can then stop by our lab to have the test done at a later time.    Video visits are billed at different rates depending on your insurance coverage.  Please reach out to your insurance provider with any questions.    If during the course of the call the physician/provider feels a video visit is not appropriate, you will not be charged for this service.\"    Patient has given verbal consent for Video visit? Yes    How would you like to obtain your AVS? Erwin    Patient would like the video invitation sent by: Send to e-mail at: [email or web address]    Will anyone else be joining your video visit? No        Video-Visit Details    Type of service:  Video Visit    Video Start Time: 4:07 PM  Video End Time: 4:15 PM    Originating Location (pt. Location): Home    Distant Location (provider location):  [facility]     Mode of Communication:  Video Conference via Doximity    HPI: Follow-up after atrial fibrillation ablation    Patient is a 45-year-old gentleman who is status post circumferential pulmonary vein isolation for symptomatic paroxysmal atrial fibrillation in June 2019.  After the ablation patient has not had any recurrent atrial fibrillation episodes.  In September 2019, patient wore a ZIO Patch monitor for 2 weeks and there was no evidence of atrial fibrillation.    In the last 6 months, patient has been doing very well.  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  He denies any symptoms of palpitations, irregular heartbeat sensation, exertional dyspnea, exertional angina, frequent lightheadedness, presyncope or syncope. He is currently not taking any medications for atrial fibrillation.    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 (Patient not taking: Reported on 9/23/2019) 60 capsule 3     HYDROcodone-acetaminophen (NORCO) 5-325 MG tablet Take 1-2 tablets by mouth every 4 hours as needed for moderate to severe pain (Patient not taking: Reported on 9/23/2019) 10 tablet 0       PAST SURGICAL HISTORY:  Past Surgical History:   Procedure Laterality Date     APPENDECTOMY OPEN  2004     EP ABLATION FOCAL AFIB N/A 6/19/2019    Procedure: EP ABLATION FOCAL AFIB;  Surgeon: Ju Dewitt MD;  Location:  HEART CARDIAC CATH LAB     REMOVE FOREIGN BODY FINGER Left 7/12/2019    Procedure: REMOVAL, FOREIGN BODY, LEFT 3RD FINGER;  Surgeon: Jay Landry MD;  Location:  OR     surgery      cheek bone surgery       ALLERGIES:   No Known Allergies    FAMILY HISTORY:  - Premature coronary artery disease  - Atrial fibrillation  - Sudden cardiac death     SOCIAL HISTORY:  Social History     Tobacco Use     Smoking status: Never Smoker     Smokeless tobacco: Never Used   Substance Use Topics     Alcohol use: Yes     Comment: 4-5 drinks per wk     Drug use: No       ROS:   Constitutional: No fever, chills, or sweats. Weight stable.   ENT: No  visual disturbance, ear ache, epistaxis, sore throat.   Cardiovascular: As per HPI.   Respiratory: No cough, hemoptysis.    GI: No nausea, vomiting, hematemesis, melena, or hematochezia.   : No hematuria.   Integument: Negative.   Psychiatric: Negative.   Hematologic:  Easy bruising, no easy bleeding.  Neuro: Negative.   Endocrinology: No significant heat or cold intolerance   Musculoskeletal: No myalgia.    Exam:  There were no vitals taken for this visit.  GENERAL APPEARANCE: healthy, alert and no distress    Labs:  CBC RESULTS:   Lab Results   Component Value Date    WBC 5.1 06/19/2019    RBC 5.00 06/19/2019    HGB 14.9 06/19/2019    HCT 45.4 06/19/2019    MCV 91 06/19/2019    MCH 29.8 06/19/2019    MCHC 32.8 06/19/2019    RDW 12.8 06/19/2019     06/19/2019       BMP RESULTS:  Lab Results   Component Value Date     06/19/2019    POTASSIUM 4.4 06/19/2019    CHLORIDE 109 06/19/2019    CO2 27 06/19/2019    ANIONGAP 5 06/19/2019     (H) 06/19/2019    BUN 16 06/19/2019    CR 1.10 06/19/2019    GFRESTIMATED 82 06/19/2019    GFRESTBLACK >90 06/19/2019    SHAKIR 8.5 06/19/2019        INR RESULTS:  No results found for: INR    Procedures:      Assessment and Plan:     Status post successful circumferential pulmonary vein isolation for symptomatic paroxysmal atrial fibrillation.    It is encouraging that patient is doing extremely well after the ablation.  We will see patient again in the heart rhythm clinic in approximately 1 year and patient will return to see the nurse practitioner.    All questions and concerns were addressed and patient is happy with plan.      CC  Patient Care Team:  William Rosenberg MD as PCP - General (Family Practice)  Flash Ramírez MD as Assigned PCP  Madeline Delatorre, RN as Specialty Care Coordinator (Cardiology)  Kimberlee Galeano RN as Specialty Care Coordinator (Cardiology)  BENNY BARRERA

## 2020-04-27 NOTE — NURSING NOTE
"Chief Complaint   Patient presents with     Atrial Fib     Paroxysmal atrial fibrillation 6 month visit,per patient no heart symptoms       Initial There were no vitals taken for this visit. Estimated body mass index is 29.24 kg/m  as calculated from the following:    Height as of 6/19/19: 1.753 m (5' 9\").    Weight as of 9/23/19: 89.8 kg (198 lb)..  BP completed using cuff size: eva SORIANO.P.N.    "

## 2020-12-07 ENCOUNTER — OFFICE VISIT (OUTPATIENT)
Dept: FAMILY MEDICINE | Facility: CLINIC | Age: 45
End: 2020-12-07
Payer: COMMERCIAL

## 2020-12-07 VITALS
TEMPERATURE: 96.6 F | BODY MASS INDEX: 29.95 KG/M2 | SYSTOLIC BLOOD PRESSURE: 138 MMHG | OXYGEN SATURATION: 99 % | HEART RATE: 80 BPM | DIASTOLIC BLOOD PRESSURE: 85 MMHG | RESPIRATION RATE: 16 BRPM | WEIGHT: 202.8 LBS

## 2020-12-07 DIAGNOSIS — Z20.2 EXPOSURE TO CHLAMYDIA: ICD-10-CM

## 2020-12-07 DIAGNOSIS — Z71.1 CONCERN ABOUT STD IN MALE WITHOUT DIAGNOSIS: Primary | ICD-10-CM

## 2020-12-07 DIAGNOSIS — R30.0 DYSURIA: ICD-10-CM

## 2020-12-07 LAB
ALBUMIN UR-MCNC: NEGATIVE MG/DL
APPEARANCE UR: CLEAR
BILIRUB UR QL STRIP: NEGATIVE
COLOR UR AUTO: YELLOW
GLUCOSE UR STRIP-MCNC: NEGATIVE MG/DL
HGB UR QL STRIP: NEGATIVE
KETONES UR STRIP-MCNC: NEGATIVE MG/DL
LEUKOCYTE ESTERASE UR QL STRIP: NEGATIVE
NITRATE UR QL: NEGATIVE
PH UR STRIP: 5 PH (ref 5–7)
SOURCE: NORMAL
SP GR UR STRIP: 1.02 (ref 1–1.03)
UROBILINOGEN UR STRIP-ACNC: 0.2 EU/DL (ref 0.2–1)

## 2020-12-07 PROCEDURE — 99213 OFFICE O/P EST LOW 20 MIN: CPT | Mod: 25 | Performed by: PHYSICIAN ASSISTANT

## 2020-12-07 PROCEDURE — 90686 IIV4 VACC NO PRSV 0.5 ML IM: CPT | Performed by: PHYSICIAN ASSISTANT

## 2020-12-07 PROCEDURE — 96372 THER/PROPH/DIAG INJ SC/IM: CPT | Performed by: PHYSICIAN ASSISTANT

## 2020-12-07 PROCEDURE — 81003 URINALYSIS AUTO W/O SCOPE: CPT | Performed by: PHYSICIAN ASSISTANT

## 2020-12-07 PROCEDURE — 90471 IMMUNIZATION ADMIN: CPT | Performed by: PHYSICIAN ASSISTANT

## 2020-12-07 RX ORDER — CEFTRIAXONE SODIUM 250 MG
250 VIAL (EA) INJECTION ONCE
Status: COMPLETED | OUTPATIENT
Start: 2020-12-07 | End: 2020-12-07

## 2020-12-07 RX ORDER — AZITHROMYCIN 500 MG/1
2000 TABLET, FILM COATED ORAL DAILY
Qty: 4 TABLET | Refills: 0 | Status: SHIPPED | OUTPATIENT
Start: 2020-12-07 | End: 2020-12-08

## 2020-12-07 RX ADMIN — Medication 250 MG: at 07:34

## 2020-12-07 NOTE — PATIENT INSTRUCTIONS
Marilee Khan,    Thank you for allowing [facility] to manage your care.    I sent your prescriptions to your pharmacy.    Please allow 1-2 business days for our office to contact you in regards to your laboratory/radiological studies.  If not done so, I encourage you to login into [employer] ([email or web address] to review your results as well.     If you have any questions or concerns, please feel free to call us at [phone]    Sincerely,    Cheng Simon PA-C    Did you know?      You can schedule a video visit for follow-up appointments as well as future appointments for certain conditions.  Please see the below link.     [email or web address]    If you have not already done so,  I encourage you to sign up for [employer] ([email or web address]).  This will allow you to review your results, securely communicate with a provider, and schedule virtual visits as well.      Patient Education     What Are Sexually Transmitted Infections (STIs)?   A sexually transmitted infection (STI) is an infection that is spread during sex. An STI can also be called STD for sexually transmitted disease. You can become infected with an STI if you have sex with someone who has an STI. Any sex that involves the penis, vagina, anus, or mouth can spread these infections. Some STIs also spread through body fluids such as semen, vaginal fluid, or blood. Others spread through contact with infected skin. The most common STIs are chlamydia, genital warts , genital herpes, syphilis, HIV, gonorrhea, and trichomoniasis.   Who is at risk?  It doesn t matter if you re straight or martins, male or female, young or old. Any person who has sex can get an STI. Your risk increases if:     You have more than one partner. The more partners you have, the greater your risk.    Your partner has other partners. If your partner is exposed to an STI, you could be, too.    You or your partner  have had sex with other people in the past. Either of you might be carrying an STI from an earlier partner.    You have an STI. The STI may cause sores or other health problems that increase your risk for new infections. Your risk will stay high unless you are treated for your current STI and change the behaviors that put you at risk.  Prevent future problems  Left untreated, certain STIs can lead to cancer or, rarely, death. Some can harm unborn babies whose mothers are infected. Others can cause you to not be able to have children (sterility) or can affect changes in behavior or your ability to think. You can prevent these problems with safer sex, regular checkups, and early treatment. Always use a latex condom when you have sex. Get tested if you re at risk. And get treated early if you have an STI.      Using a latex condom every time you have sex can reduce your risk of STIs.     Getting checked  The only sure way to know if you have an STI is to get checked by a healthcare provider. If you notice a change in how your body looks or feels, have it checked out. But keep in mind, STIs don t always show symptoms. So if you re at risk for STIs, get checked regularly. If you find you have an STI, have your partner get treatment. If not, his or her health is at risk. And left untreated, your partner could pass the STI back to you, or on to others.   Common symptoms  Be alert to any changes in your body and your partner s body. Symptoms may appear in or near the vagina, penis, rectum, mouth, or throat. They may include:     Unusual discharge    Lumps, bumps, or rashes    Sores that may be painful, itchy, or painless    Itchy skin    Burning with urination    Pain in the pelvis, belly (abdomen), or rectum    Bleeding from the rectum  Even if you don t have symptoms  You may have an STI even if you don t have symptoms. If you think you are at risk, get checked. Go to a clinic or to your healthcare provider. If your  partner has an STI, you need to be tested even if you feel fine.   Vaccines to prevent disease   Vaccines are available to prevent hepatitis A and hepatitis B. These are 2 kinds of STIs. There is also a vaccine to prevent human papillomavirus (HPV). This is a virus that can be passed from person to person through sexual contact. Ask your healthcare provider whether any of these vaccines is right for you.   [employer] last reviewed this educational content on 12/1/2018 2000-2020 [employer]. [address] [place] [address], [place] [address]. All rights reserved. This information is not intended as a substitute for professional medical care. Always follow your healthcare professional's instructions.

## 2020-12-07 NOTE — PROGRESS NOTES
Subjective     Bill Wadsworth is a 45 year old male who presents to clinic today for the following health issues:    HPI     Patient would like Chlamydia testing. Dysuria at the end of urination. Partner told him she had chlamydia one week ago. No other symptoms. No discharge, rashes, sores, testicular pain or other symptoms.    Went to [place] 4-5 months ago and shared a hotel room with friends. Body aches, headache, and 2 weeks of feeling fatigued. Mother had COVID shortly thereafter and the patient didn't get ill. Wants to discuss COVID antibody testing    Review of Systems   Constitutional, HEENT, cardiovascular, pulmonary, gi and gu systems are negative, except as otherwise noted.      Objective    /85   Pulse 80   Temp 96.6  F (35.9  C) (Tympanic)   Resp 16   Wt 92 kg (202 lb 12.8 oz)   SpO2 99%   BMI 29.95 kg/m    Body mass index is 29.95 kg/m .  Physical Exam  Vitals signs and nursing note reviewed.   Constitutional:       General: He is not in acute distress.     Appearance: Normal appearance. He is not diaphoretic.   HENT:      Head: Normocephalic and atraumatic.      Nose: Nose normal.   Eyes:      Conjunctiva/sclera: Conjunctivae normal.   Pulmonary:      Effort: Pulmonary effort is normal. No respiratory distress.   Skin:     General: Skin is warm and dry.      Coloration: Skin is not jaundiced or pale.   Neurological:      General: No focal deficit present.      Mental Status: He is alert. Mental status is at baseline.   Psychiatric:         Mood and Affect: Mood normal.         Behavior: Behavior normal.          Chlamydia, gonorrhea testing pending.        Assessment & Plan   Problem List Items Addressed This Visit     None      Visit Diagnoses     Concern about STD in male without diagnosis    -  Primary    Relevant Medications    cefTRIAXone (ROCEPHIN) injection 250 mg (Start on 12/7/2020  7:30 AM)    azithromycin (ZITHROMAX) 500 MG tablet    Other Relevant Orders    NEISSERIA  GONORRHOEA PCR    CHLAMYDIA TRACHOMATIS PCR    Exposure to chlamydia        Dysuria               Impression is likely chlamydia infection.  Has only mild dysuria symptoms.  No other partners.  We will cover him for chlamydia and gonorrhea with both azithromycin and ceftriaxone, respectively.  Discussed safe sex.  After discussion about Covid antibody testing, the patient elected not to pursue this and I feel this is reasonable.     Complete history and physical exam as above. AF with normal VS.    DDx and Dx discussed with and explained to the pt to their satisfaction.  All questions were answered at this time. Pt expressed understanding of and agreement with this dx, tx, and plan. No further workup warranted and standard medication warnings given. I have given the patient a list of pertinent indications for re-evaluation. Will go to the Emergency Department if symptoms worsen or new concerning symptoms arise. Patient left in no apparent distress.     See Patient Instructions  Return if symptoms worsen or fail to improve.    KALANI Durham  [facility]

## 2021-01-08 ENCOUNTER — TELEPHONE (OUTPATIENT)
Dept: CARDIOLOGY | Facility: CLINIC | Age: 46
End: 2021-01-08

## 2021-01-08 NOTE — TELEPHONE ENCOUNTER
Left message to return clinic call to .  Jorge Durbin L.P.N.    Ju Lopez MD. ,MS,FAROSALIA,[facility],RS  Visit due noted in work que .Jorge Durbin L.P.N.,[facility]. Dept.

## 2021-04-17 ENCOUNTER — HEALTH MAINTENANCE LETTER (OUTPATIENT)
Age: 46
End: 2021-04-17

## 2021-10-02 ENCOUNTER — HEALTH MAINTENANCE LETTER (OUTPATIENT)
Age: 46
End: 2021-10-02

## 2021-10-22 NOTE — PATIENT INSTRUCTIONS
Marilee Khan,    Thank you for allowing [facility] to manage your care.    If you have any questions or concerns, please feel free to call us at [phone]    Sincerely,    Cheng Simon PA-C    Did you know?      You can schedule a video visit for follow-up appointments as well as future appointments for certain conditions.  Please see the below link.     [email or web address]    If you have not already done so,  I encourage you to sign up for [employer] ([email or web address]).  This will allow you to review your results, securely communicate with a provider, and schedule virtual visits as well.    Preparing for Your Surgery  Getting started  A nurse will call you to review your health history and instructions. They will give you an arrival time based on your scheduled surgery time.  Please be ready to share the following:    Your doctor's clinic name and phone number    Your medical, surgical and anesthesia history    A list of allergies and sensitivities    A list of medicines, including herbal treatments and over-the-counter drugs    Whether the patient has a legal guardian (ask how to send us the papers in advance)  If you have a child who's having surgery, please ask for a copy of Preparing for Your Child's Surgery.    Preparing for surgery    Within 30 days of surgery: Have a pre-op exam (sometimes called an H&P, or History and Physical). This can be done at a clinic or pre-operative center.  ? If you're having a , you may not need this exam. Talk to your care team    At your pre-op exam, talk to your care team about all medicines you take. If you need to stop any medicines before surgery, ask when to start taking them again.  ? We do this for your safety. Many medicines can make you bleed too much during surgery. Some change how well surgery (anesthesia) drugs work.    Call your insurance company to let them know you're having surgery. (If  you don't have insurance, call [phone].)    Call your clinic if there's any change in your health. This includes signs of a cold or flu (sore throat, runny nose, cough, rash, fever). It also includes a scrape or scratch near the surgery site.    If you have questions on the day of surgery, call your hospital or surgery center.  Eating and drinking guidelines  For your safety: Unless your surgeon tells you otherwise, follow the guidelines below.    Eat and drink as usual until 8 hours before surgery. After that, no food or milk.    Drink clear liquids until 2 hours before surgery. These are liquids you can see through, like water, Gatorade and Propel Water. You may also have black coffee and tea (no cream or milk).    Nothing by mouth within 2 hours of surgery. This includes gum, candy and breath mints.    If you drink, stop drinking alcohol the night before surgery.    If your care team tells you to take medicine on the morning of surgery, it's okay to take it with a sip of water.  Preventing infection    Shower or bathe the night before and morning of your surgery. Follow the instructions your clinic gave you. (If no instructions, use regular soap.)    Don't shave or clip hair near your surgery site. We'll remove the hair if needed.    Don't smoke or vape the morning of surgery. You may chew nicotine gum up to 2 hours before surgery. A nicotine patch is okay.  ? Note: Some surgeries require you to completely quit smoking and nicotine. Check with your surgeon.    Your care team will make every effort to keep you safe from infection. We will:  ? Clean our hands often with soap and water (or an alcohol-based hand rub).  ? Clean the skin at your surgery site with a special soap that kills germs.  ? Give you a special gown to keep you warm. (Cold raises the risk of infection.)  ? Wear special hair covers, masks, gowns and gloves during surgery.  ? Give antibiotic medicine, if prescribed. Not all surgeries need  antibiotics.  What to bring on the day of surgery    Photo ID and insurance card    Copy of your health care directive, if you have one    Glasses and hearing aides (bring cases)  ? You can't wear contacts during surgery    Inhaler and eye drops, if you use them (tell us about these when you arrive)    CPAP machine or breathing device, if you use them    A few personal items, if spending the night    If you have . . .  ? A pacemaker or ICD (cardiac defibrillator): Bring the ID card.  ? An implanted stimulator: Bring the remote control.  ? A legal guardian: Bring a copy of the certified (court-stamped) guardianship papers.  Please remove any jewelry, including body piercings. Leave jewelry and other valuables at home.  If you're going home the day of surgery  Important: If you don't follow the rules below, we must cancel your surgery.     Arrange for someone to drive you home after surgery. You may not drive, take a taxi or take public transportation by yourself (unless you'll have local anesthesia only).    Arrange for a responsible adult to stay with you overnight. If you don't, we may keep you in the hospital overnight, and you may need to pay the costs yourself.  Questions?   If you have any questions for your care team, list them here: _________________________________________________________________________________________________________________________________________________________________________________________________________________________________________________________________________________________________________________________  For informational purposes only. Not to replace the advice of your health care provider. Copyright   2003, 2019 [facility]. All rights reserved. Clinically reviewed by Noemy Kelly MD. [employer] 774723 - REV 4/20.

## 2021-10-22 NOTE — PROGRESS NOTES
[facility] [place]  [address]  [place] [address]  Phone: [phone]  Primary Provider: William Rosenberg  Pre-op Performing Provider: EMMETT HERNANDEZ    PREOPERATIVE EVALUATION:  Today's date: 10/25/2021    Bill [place] is a 46 year old male who presents for a preoperative evaluation.    Surgical Information:  Surgery/Procedure: Cyst on shoulder  Surgery Location: [facility] [place]  Surgeon: Stevie  Surgery Date: 10/27/21  Time of Surgery: TBD   Where patient plans to recover: At home with family  Fax number for surgical facility: [phone]    Type of Anesthesia Anticipated: General    Assessment & Plan     The proposed surgical procedure is considered INTERMEDIATE risk.    Problem List Items Addressed This Visit     None      Visit Diagnoses     Preop general physical exam    -  Primary    Pre-op exam        Relevant Orders    EKG 12-lead complete w/read - Clinics (Completed)               Risks and Recommendations:  The patient has the following additional risks and recommendations for perioperative complications:   - No identified additional risk factors other than previously addressed    Medication Instructions:   - aspirin: Discontinue aspirin 7-10 days prior to procedure to reduce bleeding risk. It should be resumed postoperatively.    - ibuprofen (Advil, Motrin): HOLD 1 day before surgery.    - naproxen (Aleve, Naprosyn): HOLD 4 days before surgery.     RECOMMENDATION:  APPROVAL GIVEN to proceed with proposed procedure, without further diagnostic evaluation.    Subjective     HPI related to upcoming procedure:   Bill W[place] is a 46 year old male presenting today for a pre-op examination for upcoming excision of left upper arm cyst scheduled 10/27/21 with Dr. Hubbard at [facility].     Enrique has history of left pectoralis major repair 7 years ago; 2 years ago noticed small lump on the surgical incision site; lump has since enlarged in size; is  tender with palpation; had It removed on 8/20/21, but it returned.    Preop Questions 10/22/2021   1. Have you ever had a heart attack or stroke? No   2. Have you ever had surgery on your heart or blood vessels, such as a stent placement, a coronary artery bypass, or surgery on an artery in your head, neck, heart, or legs? YES - a fib, ablation   3. Do you have chest pain with activity? No   4. Do you have a history of  heart failure? No   5. Do you currently have a cold, bronchitis or symptoms of other infection? No   6. Do you have a cough, shortness of breath, or wheezing? No   7. Do you or anyone in your family have previous history of blood clots? No   8. Do you or does anyone in your family have a serious bleeding problem such as prolonged bleeding following surgeries or cuts? No   9. Have you ever had problems with anemia or been told to take iron pills? No   10. Have you had any abnormal blood loss such as black, tarry or bloody stools? No   11. Have you ever had a blood transfusion? No   12. Are you willing to have a blood transfusion if it is medically needed before, during, or after your surgery? Yes   13. Have you or any of your relatives ever had problems with anesthesia? No   14. Do you have sleep apnea, excessive snoring or daytime drowsiness? No   15. Do you have any artifical heart valves or other implanted medical devices like a pacemaker, defibrillator, or continuous glucose monitor? No   16. Do you have artificial joints? No   17. Are you allergic to latex? No       Health Care Directive:  Patient does not have a Health Care Directive or Living Will: Discussed advance care planning with patient; however, patient declined at this time.    Preoperative Review of :   reviewed - no record of controlled substances prescribed.    Status of Chronic Conditions:  A-FIB - Patient has a history of chronic A-fib, now s/p ablation.    Review of Systems  CONSTITUTIONAL: NEGATIVE for fever, chills, change  in weight  INTEGUMENTARY/SKIN: NEGATIVE for worrisome rashes, moles or lesions  EYES: NEGATIVE for vision changes or irritation  ENT/MOUTH: NEGATIVE for ear, mouth and throat problems  RESP: NEGATIVE for significant cough or SOB  CV: NEGATIVE for chest pain, palpitations or peripheral edema  GI: NEGATIVE for nausea, abdominal pain, heartburn, or change in bowel habits  : NEGATIVE for frequency, dysuria, or hematuria  MUSCULOSKELETAL: NEGATIVE for significant arthralgias or myalgia  NEURO: NEGATIVE for weakness, dizziness or paresthesias  ENDOCRINE: NEGATIVE for temperature intolerance, skin/hair changes  HEME: NEGATIVE for bleeding problems  PSYCHIATRIC: NEGATIVE for changes in mood or affect    Patient Active Problem List    Diagnosis Date Noted     S/P ablation of atrial fibrillation 06/19/2019     Priority: Medium     Paroxysmal atrial fibrillation (H) 04/25/2019     Priority: Medium     Added automatically from request for surgery [id number]       Fear of flying 01/15/2015     Priority: Medium     Pectoralis muscle strain 07/22/2014     Priority: Medium     Impingement syndrome of right shoulder 05/19/2014     Priority: Medium     CARDIOVASCULAR SCREENING; LDL GOAL LESS THAN 160 09/10/2011     Priority: Medium     [place] 10-year CHD Risk Score: 1% (4 Total Points)   Values used to calculate score:     Age: 36 years -- Points: -4     Total Cholesterol: 164 mg/dL -- Points: 4     HDL Cholesterol: 34 mg/dL -- Points: 2     Systolic BP (treated): 110 mmHg -- Points: 0    The patient is not a smoker. -- Points: 0    The patient has not been diagnosed with diabetes. -- Points: 0     The patient has a family history of CHD. -- Points: x2          Closed fracture of metatarsal bone 03/23/2009     Priority: Medium      Past Medical History:   Diagnosis Date     Antiplatelet or antithrombotic long-term use      Arrhythmia     paroxysmal atrial fib     Hypertension goal BP (blood pressure) < 140/90      Pectoralis  "muscle rupture 7/2014    left     Past Surgical History:   Procedure Laterality Date     APPENDECTOMY OPEN  2004     EP ABLATION FOCAL AFIB N/A 6/19/2019    Procedure: EP ABLATION FOCAL AFIB;  Surgeon: Ju Dewitt MD;  Location:  [facility]     REMOVE FOREIGN BODY FINGER Left 7/12/2019    Procedure: REMOVAL, FOREIGN BODY, LEFT 3RD FINGER;  Surgeon: Jay Landry MD;  Location: [facility] OR     surgery      cheek bone surgery     Current Outpatient Medications   Medication Sig Dispense Refill     dabigatran ANTICOAGULANT (PRADAXA ANTICOAGULANT) 150 MG capsule Take 1 capsule (150 mg) by mouth 2 times daily Store in original 's bottle or blister pack; use within 120 days of opening. (Patient not taking: Reported on 9/23/2019) 60 capsule 3     HYDROcodone-acetaminophen (NORCO) 5-325 MG tablet Take 1-2 tablets by mouth every 4 hours as needed for moderate to severe pain (Patient not taking: Reported on 9/23/2019) 10 tablet 0       No Known Allergies     Social History     Tobacco Use     Smoking status: Never Smoker     Smokeless tobacco: Never Used   Substance Use Topics     Alcohol use: Yes     Comment: 4-5 drinks per wk     Family History   Problem Relation Age of Onset     Hypertension Mother      No Known Problems Father      Cancer Paternal Grandmother      Cancer - colorectal Paternal Grandfather      No Known Problems Sister      History   Drug Use No         Objective     BP (!) 144/90   Pulse 71   Temp 96.9  F (36.1  C) (Tympanic)   Resp 14   Ht 1.76 m (5' 9.29\")   Wt 92.1 kg (203 lb)   SpO2 98%   BMI 29.73 kg/m      Physical Exam    GENERAL APPEARANCE: healthy, alert and no distress     EYES: EOMI,  PERRL     HENT:nose and mouth without ulcers or lesions     NECK: no adenopathy, no asymmetry, masses, or scars and thyroid normal to palpation     RESP: lungs clear to auscultation - no rales, rhonchi or wheezes     CV: regular rates and rhythm, normal S1 S2, no S3 or S4 and no " murmur, click or rub     ABDOMEN:  soft, nontender, no HSM or masses and bowel sounds normal     MS: soft tissue mass to anterior left shoulder along surgical scar. Otherwise, extremities normal- no gross deformities noted, no evidence of inflammation in joints, FROM in all extremities.     SKIN: no suspicious lesions or rashes     NEURO: Normal strength and tone, sensory exam grossly normal, mentation intact and speech normal     PSYCH: mentation appears normal. and affect normal/bright     LYMPHATICS: No cervical adenopathy    No results for input(s): HGB, PLT, INR, NA, POTASSIUM, CR, A1C in the last 28100 hours.     Diagnostics:  No labs were ordered during this visit.   EKG: sinus rhythm with HR of 61bpm. No significant change from previous.    Revised Cardiac Risk Index (RCRI):  The patient has the following serious cardiovascular risks for perioperative complications:   - No serious cardiac risks = 0 points     RCRI Interpretation: 0 points: Class I (very low risk - 0.4% complication rate)    Signed Electronically by: KALANI Durham  Copy of this evaluation report is provided to requesting physician.

## 2021-10-25 ENCOUNTER — OFFICE VISIT (OUTPATIENT)
Dept: FAMILY MEDICINE | Facility: CLINIC | Age: 46
End: 2021-10-25
Payer: COMMERCIAL

## 2021-10-25 VITALS
SYSTOLIC BLOOD PRESSURE: 144 MMHG | HEIGHT: 69 IN | RESPIRATION RATE: 14 BRPM | WEIGHT: 203 LBS | TEMPERATURE: 96.9 F | HEART RATE: 71 BPM | DIASTOLIC BLOOD PRESSURE: 90 MMHG | BODY MASS INDEX: 30.07 KG/M2 | OXYGEN SATURATION: 98 %

## 2021-10-25 DIAGNOSIS — M79.89 SOFT TISSUE MASS: ICD-10-CM

## 2021-10-25 DIAGNOSIS — Z01.818 PREOP GENERAL PHYSICAL EXAM: Primary | ICD-10-CM

## 2021-10-25 PROCEDURE — 99214 OFFICE O/P EST MOD 30 MIN: CPT | Performed by: PHYSICIAN ASSISTANT

## 2021-10-25 PROCEDURE — 93000 ELECTROCARDIOGRAM COMPLETE: CPT | Performed by: PHYSICIAN ASSISTANT

## 2021-10-25 ASSESSMENT — MIFFLIN-ST. JEOR: SCORE: 1800.79

## 2021-10-25 ASSESSMENT — PAIN SCALES - GENERAL: PAINLEVEL: MODERATE PAIN (5)

## 2022-05-02 NOTE — Clinical Note
Chief Complaint  Annual Exam and Neck Pain    Subjective        Festus French presents to [facility]  Neck pain:  Has had for a long time.  Gut has noticed of lat will hurt with clenching teeth only.  Will stop when releases teeth clench.    Annual exam:    Neck Pain   Pertinent negatives include no chest pain, fever, numbness or weakness.         Past History:    Medical History: has a past medical history of Benign heart murmur, Hypertension, Hypothyroidism, Keloid, Leukopenia, Osteopenia, and Sickle cell anemia without crisis (HCC).     Surgical History: has a past surgical history that includes Breast Reduction (Bilateral, 2013); Hysterectomy (1997); and Colonoscopy (01/2021).     Family History: family history includes Alcohol abuse in her father; Aneurysm in her sister; Arthritis in her mother; Breast cancer (age of onset: 65) in her mother; COPD in her mother; Cirrhosis in her father; Diabetes in her brother, mother, and sister; Fibromyalgia in her sister; Heart disease in her brother and mother; Hypertension in her brother, mother, and sister; Sarcoidosis in her sister; Stroke in her sister.     Social History: reports that she quit smoking about 6 years ago. Her smoking use included cigarettes. She smoked 0.30 packs per day. She has never used smokeless tobacco. She reports that she does not drink alcohol and does not use drugs.    Allergies: Penicillins and Morphine          Current Outpatient Medications:   •  amLODIPine (NORVASC) 10 MG tablet, TAKE 1 TABLET BY MOUTH DAILY, Disp: 90 tablet, Rfl: 1  •  buPROPion XL (WELLBUTRIN XL) 300 MG 24 hr tablet, TAKE 1 TABLET(300 MG) BY MOUTH EVERY DAY, Disp: 90 tablet, Rfl: 1  •  busPIRone (BUSPAR) 10 MG tablet, Take 1 tablet by mouth 3 (Three) Times a Day., Disp: 180 tablet, Rfl: 1  •  CALCIUM PO, Take 600 mg by mouth Daily., Disp: , Rfl:   •  cloNIDine (CATAPRES) 0.3 MG tablet, TAKE 1 TABLET BY MOUTH DAILY, Disp: 90 tablet, Rfl:  Family has been updated.  Per Dr Dewitt "1  •  Cyanocobalamin (B-12 PO), Take 1,000 mg by mouth Daily., Disp: , Rfl:   •  fosinopril (MONOPRIL) 40 MG tablet, TAKE 1 TABLET BY MOUTH DAILY, Disp: 90 tablet, Rfl: 1  •  hydroCHLOROthiazide (HYDRODIURIL) 50 MG tablet, TAKE 1 TABLET BY MOUTH DAILY, Disp: 90 tablet, Rfl: 1  •  levothyroxine (SYNTHROID, LEVOTHROID) 150 MCG tablet, TAKE 1 TABLET BY MOUTH DAILY, Disp: 90 tablet, Rfl: 1  •  potassium chloride 10 MEQ CR tablet, Take 1 tablet by mouth Daily., Disp: 90 tablet, Rfl: 1  •  phentermine (ADIPEX-P) 37.5 MG tablet, Take 37.5 mg by mouth Every Other Day., Disp: , Rfl: 0  •  Tdap (Boostrix) 5-2.5-18.5 LF-MCG/0.5 injection, Inject 0.5 mL into the appropriate muscle as directed by prescriber 1 (One) Time for 1 dose., Disp: 0.5 mL, Rfl: 0  •  topiramate (TOPAMAX) 50 MG tablet, Take 50 mg by mouth 2 (Two) Times a Day., Disp: , Rfl:   •  Zoster Vac Recomb Adjuvanted 50 MCG/0.5ML reconstituted suspension, Inject 0.5 mL into the appropriate muscle as directed by prescriber 1 (One) Time for 1 dose., Disp: 1 each, Rfl: 1    There are no discontinued medications.      Review of Systems   Constitutional: Negative for fever.   Respiratory: Negative for cough and shortness of breath.    Cardiovascular: Negative for chest pain, palpitations and leg swelling.   Musculoskeletal: Positive for neck pain.   Neurological: Negative for dizziness, weakness, numbness and headache.        Objective         Vitals:    05/02/22 0743   BP: 142/70   BP Location: Right arm   Patient Position: Sitting   Cuff Size: Large Adult   Pulse: 91   Resp: 16   Temp: 97.3 °F (36.3 °C)   TempSrc: Infrared   SpO2: 97%   Weight: 93.7 kg (206 lb 9.6 oz)   Height: 167.6 cm (65.98\")     Body mass index is 33.36 kg/m².         Physical Exam  Vitals reviewed.   Constitutional:       Appearance: Normal appearance. She is well-developed.   HENT:      Head: Normocephalic and atraumatic.      Mouth/Throat:      Pharynx: No oropharyngeal exudate.   Eyes:      " Conjunctiva/sclera: Conjunctivae normal.      Pupils: Pupils are equal, round, and reactive to light.   Cardiovascular:      Rate and Rhythm: Normal rate and regular rhythm.      Heart sounds: Normal heart sounds. No murmur heard.    No friction rub. No gallop.   Pulmonary:      Effort: Pulmonary effort is normal.      Breath sounds: Normal breath sounds. No wheezing or rhonchi.   Skin:     General: Skin is warm and dry.   Neurological:      Mental Status: She is alert and oriented to person, place, and time.   Psychiatric:         Mood and Affect: Mood and affect normal.         Behavior: Behavior normal.         Thought Content: Thought content normal.         Judgment: Judgment normal.             Result Review :               Assessment and Plan     Diagnoses and all orders for this visit:    1. Encounter for screening mammogram for malignant neoplasm of breast (Primary)  -     Mammo Screening Digital Tomosynthesis Bilateral With CAD; Future    2. Post-menopause  -     DEXA Bone Density Axial; Future    3. Need for hepatitis C screening test  -     Hepatitis C antibody; Future    4. Need for shingles vaccine  -     Zoster Vac Recomb Adjuvanted 50 MCG/0.5ML reconstituted suspension; Inject 0.5 mL into the appropriate muscle as directed by prescriber 1 (One) Time for 1 dose.  Dispense: 1 each; Refill: 1    5. Need for Tdap vaccination  -     Tdap (Boostrix) 5-2.5-18.5 LF-MCG/0.5 injection; Inject 0.5 mL into the appropriate muscle as directed by prescriber 1 (One) Time for 1 dose.  Dispense: 0.5 mL; Refill: 0    6. Anemia, unspecified type  -     CBC w AUTO Differential; Future  -     Ferritin; Future  -     Folate; Future  -     Vitamin B12; Future    7. Primary hypertension  -     Comprehensive metabolic panel; Future  -     Lipid Panel w/ Chol/HDL Ratio; Future  -     Urinalysis With Culture If Indicated -; Future    8. Disorder of thyroid  -     TSH; Future    9. Medication monitoring encounter  -     Vitamin  D 25 hydroxy; Future    10. Annual physical exam  Comments:  discussed diet and exercise.  Walking more since warmer.              Follow Up     Return in about 6 months (around 11/2/2022).    Patient was given instructions and counseling regarding her condition or for health maintenance advice. Please see specific information pulled into the AVS if appropriate.

## 2022-05-14 ENCOUNTER — HEALTH MAINTENANCE LETTER (OUTPATIENT)
Age: 47
End: 2022-05-14

## 2023-01-14 ENCOUNTER — HEALTH MAINTENANCE LETTER (OUTPATIENT)
Age: 48
End: 2023-01-14

## 2023-04-22 ENCOUNTER — NURSE TRIAGE (OUTPATIENT)
Dept: NURSING | Facility: CLINIC | Age: 48
End: 2023-04-22

## 2023-04-22 NOTE — TELEPHONE ENCOUNTER
"Nurse Triage SBAR    Is this a 2nd Level Triage? No    Situation: Patient is calling with concern of tooth pain after a crown placement. Patient has been taking ibuprofen which has been managing the pain. Pain will diminish but return if not taking ibuprofen. Patient states the pain starts on the right side of his face, lower jaw, will then travel up to the right side of his face. Cheek on that side feels hotter than left cheek at time. New for the last 2-3 days is a nickel size rash on the corner of his right eye.     Background: Crown placed on tooth over two weeks ago, then traveled the next day to [place] scuba diving. Right ear had trouble popping the whole time he was scuba diving.     Assessment:   Has nickel sized patch of \"rash\" in the temple region of the right eye X 2-3 days. Patient will rub the area once in a while. Site is not painful or itchy. Denies individual dots, bumps or blisters. Denies vision pain or changes to vision.  Temperature today was 98.7-99.0, contact thermometer  Once advil wears off, the jaw/facial pain is moderate  Patient has had increased jaw pain for about one week.     Recommendation: Per disposition, Call Dentist when office is open. Advised patient UC is an option if he does not want to wait until dental office is open. Home care advised for the facial rash. Home care advise provided. Offered emergency dental resources, patient declined. Advised patient to call back with any new or worsening symptoms. Patient verbalized understanding and agrees with plan.    Protocol Recommended Disposition: Referred to other agency    Karmen Kendall RN on 4/22/2023 at 1:06 PM  [facility]    Reason for Disposition    Toothache is main symptom    Toothache present > 24 hours    Mild localized rash    Additional Information    Negative: Shock suspected (e.g., cold/pale/clammy skin, too weak to stand, low BP, rapid pulse)    Negative: [1] Similar pain previously AND [2] it " "was from \"heart attack\"    Negative: [1] Similar pain previously AND [2] it was from \"angina\" AND [3] not relieved by nitroglycerin    Negative: Sounds like a life-threatening emergency to the triager    Negative: Chest pain    Negative: Sinus pain and congestion    Negative: Headache or pain in upper forehead    Negative: Shock suspected (e.g., cold/pale/clammy skin, too weak to stand, low BP, rapid pulse)    Negative: [1] Similar pain previously AND [2] it was from \"heart attack\"    Negative: [1] Similar pain previously AND [2] it was from \"angina\" AND [3] not relieved by nitroglycerin    Negative: Sounds like a life-threatening emergency to the triager    Negative: Chest pain    Negative: Toothache followed tooth injury    Negative: Toothache or mouth pain after tooth extraction    Negative: Canker sore (i.e., aphthous ulcer)    Negative: Tongue is very swollen and tender    Negative: [1] Face is swollen AND [2] fever    Negative: Patient sounds very sick or weak to the triager    Negative: [1] SEVERE pain (e.g., excruciating, unable to do any normal activities) AND [2] not improved 2 hours after pain medicine    Negative: Face is very swollen    Negative: Fever    Negative: [1] Face is swollen AND [2] no fever    Negative: [1] Sudden onset of rash (within last 2 hours) AND [2] difficulty breathing or swallowing    Negative: Sounds like a life-threatening emergency to the triager    Negative: Athlete's Foot suspected (i.e., itchy rash between the toes)    Negative: Impetigo suspected (i.e., painless infected superficial small sores, less than 1 inch or 2.5 cm, often covered by a soft, yellow-brown scab or crust; sometimes occurring near nasal openings)    Negative: Insect bite(s) suspected    Negative: Jock Itch suspected (i.e., itchy rash on inner thighs near genital area)    Negative: Localized lump (or swelling) without redness or rash    Negative: Poison ivy, oak, or sumac contact suspected    Negative: Rash " of female genital area (vulva)    Negative: Rash of male genital area (penis or scrotum)    Negative: Redness of immunization site    Negative: Ringworm suspected (i.e., round pink patch, sometimes looks like ring, usually 1/2 to 1 inch [12-25 mm],  in size, slowly increasing in size)    Negative: Shingles suspected (i.e., painful rash, multiple small blisters grouped together in one area of body; dermatomal distribution)    Negative: Small spot, skin growth, or mole    Negative: Sores or skin ulcer, not a rash    Negative: Wound infection suspected (i.e., pain, spreading redness, or pus; in a cut, puncture, scrape or sutured wound)    Negative: [1] Localized purple or blood-colored spots or dots AND [2] not from injury or friction AND [3] fever    Negative: Patient sounds very sick or weak to the triager    Negative: [1] Red area or streak AND [2] fever    Negative: [1] Rash is painful to touch AND [2] fever    Negative: [1] Looks infected (spreading redness, pus) AND [2] large red area (> 2 in. or 5 cm)    Negative: [1] Looks infected (spreading redness, pus) AND [2] diabetes mellitus or weak immune system (e.g., HIV positive, cancer chemo, splenectomy, organ transplant, chronic steroids)    Negative: [1] Localized purple or blood-colored spots or dots AND [2] not from injury or friction AND [3] no fever    Negative: [1] Looks infected (spreading redness, pus) AND [2] no fever    Negative: Looks like a boil, infected sore, deep ulcer or other infected rash    Negative: [1] Localized rash is very painful AND [2] no fever    Negative: Genital area rash    Negative: Lyme disease suspected (e.g., bull's eye rash or tick bite / exposure)    Negative: [1] Applying cream or ointment AND [2] causes severe itch, burning or pain    Negative: Medication patch causing local rash or itching    Negative: [1] Pimples (localized) AND [2 ] no improvement after using CARE ADVICE    Negative: Tender bumps in armpits    Negative: [1]  Severe localized itching AND [2] after 2 days of steroid cream    Negative: Localized rash present > 7 days    Negative: Red, moist, irritated area between skin folds (or under larger breasts)    Negative: [1] Localized area of skin darkening or thickening on lower legs or ankles AND [2] has NOT been evaluated by a doctor (or NP/PA)    Protocols used: FACE PAIN-A-AH, TOOTHACHE-A-AH, RASH OR REDNESS - [id number]

## 2023-06-02 ENCOUNTER — HEALTH MAINTENANCE LETTER (OUTPATIENT)
Age: 48
End: 2023-06-02

## 2024-03-11 NOTE — PATIENT INSTRUCTIONS
Preventive Care Advice   This is general advice given by our system to help you stay healthy. However, your care team may have specific advice just for you. Please talk to your care team about your preventive care needs.  Nutrition  Eat 5 or more servings of fruits and vegetables each day.  Try wheat bread, brown rice and whole grain pasta (instead of white bread, rice, and pasta).  Get enough calcium and vitamin D. Check the label on foods and aim for 100% of the RDA (recommended daily allowance).  Lifestyle  Exercise at least 150 minutes each week   (30 minutes a day, 5 days a week).  Do muscle strengthening activities 2 days a week. These help control your weight and prevent disease.  No smoking.  Wear sunscreen to prevent skin cancer.  Have a dental exam and cleaning every 6 months.  Yearly exams  See your health care team every year to talk about:  Any changes in your health.  Any medicines your care team has prescribed.  Preventive care, family planning, and ways to prevent chronic diseases.  Shots (vaccines)   HPV shots (up to age 26), if you've never had them before.  Hepatitis B shots (up to age 59), if you've never had them before.  COVID-19 shot: Get this shot when it's due.  Flu shot: Get a flu shot every year.  Tetanus shot: Get a tetanus shot every 10 years.  Pneumococcal, hepatitis A, and RSV shots: Ask your care team if you need these based on your risk.  Shingles shot (for age 50 and up).  General health tests  Diabetes screening:  Starting at age 35, Get screened for diabetes at least every 3 years.  If you are younger than age 35, ask your care team if you should be screened for diabetes.  Cholesterol test: At age 39, start having a cholesterol test every 5 years, or more often if advised.  Bone density scan (DEXA): At age 50, ask your care team if you should have this scan for osteoporosis (brittle bones).  Hepatitis C: Get tested at least once in your life.  STIs (sexually transmitted  infections)  Before age 24: Ask your care team if you should be screened for STIs.  After age 24: Get screened for STIs if you're at risk. You are at risk for STIs (including HIV) if:  You are sexually active with more than one person.  You don't use condoms every time.  You or a partner was diagnosed with a sexually transmitted infection.  If you are at risk for HIV, ask about PrEP medicine to prevent HIV.  Get tested for HIV at least once in your life, whether you are at risk for HIV or not.  Cancer screening tests  Cervical cancer screening: If you have a cervix, begin getting regular cervical cancer screening tests at age 21. Most people who have regular screenings with normal results can stop after age 65. Talk about this with your provider.  Breast cancer scan (mammogram): If you've ever had breasts, begin having regular mammograms starting at age 40. This is a scan to check for breast cancer.  Colon cancer screening: It is important to start screening for colon cancer at age 45.  Have a colonoscopy test every 10 years (or more often if you're at risk) Or, ask your provider about stool tests like a FIT test every year or Cologuard test every 3 years.  To learn more about your testing options, visit: https://[email or web address]/988022.pdf.  For help making a decision, visit: https://bit.ly/yj87389.  Prostate cancer screening test: If you have a prostate and are age 55 to 69, ask your provider if you would benefit from a yearly prostate cancer screening test.  Lung cancer screening: If you are a current or former smoker age 50 to 80, ask your care team if ongoing lung cancer screenings are right for you.  For informational purposes only. Not to replace the advice of your health care provider. Copyright   2023 [employer]. All rights reserved. Clinically reviewed by the [facility] Transitions Program. miCab [id number] - REV 01/24.

## 2024-03-18 ENCOUNTER — OFFICE VISIT (OUTPATIENT)
Dept: FAMILY MEDICINE | Facility: CLINIC | Age: 49
End: 2024-03-18
Payer: COMMERCIAL

## 2024-03-18 ENCOUNTER — ORDERS ONLY (AUTO-RELEASED) (OUTPATIENT)
Dept: FAMILY MEDICINE | Facility: CLINIC | Age: 49
End: 2024-03-18

## 2024-03-18 VITALS
RESPIRATION RATE: 16 BRPM | SYSTOLIC BLOOD PRESSURE: 138 MMHG | BODY MASS INDEX: 31.1 KG/M2 | HEIGHT: 69 IN | OXYGEN SATURATION: 99 % | HEART RATE: 80 BPM | DIASTOLIC BLOOD PRESSURE: 87 MMHG | WEIGHT: 210 LBS | TEMPERATURE: 97.8 F

## 2024-03-18 DIAGNOSIS — Z00.00 ROUTINE GENERAL MEDICAL EXAMINATION AT A HEALTH CARE FACILITY: Primary | ICD-10-CM

## 2024-03-18 DIAGNOSIS — Z12.11 SCREEN FOR COLON CANCER: ICD-10-CM

## 2024-03-18 DIAGNOSIS — F40.243 FEAR OF FLYING: ICD-10-CM

## 2024-03-18 PROCEDURE — 99213 OFFICE O/P EST LOW 20 MIN: CPT | Mod: 25 | Performed by: PHYSICIAN ASSISTANT

## 2024-03-18 PROCEDURE — 99396 PREV VISIT EST AGE 40-64: CPT | Performed by: PHYSICIAN ASSISTANT

## 2024-03-18 RX ORDER — DIAZEPAM 5 MG
5 TABLET ORAL EVERY 12 HOURS PRN
Qty: 15 TABLET | Refills: 0 | Status: SHIPPED | OUTPATIENT
Start: 2024-03-18

## 2024-03-18 SDOH — HEALTH STABILITY: PHYSICAL HEALTH: ON AVERAGE, HOW MANY DAYS PER WEEK DO YOU ENGAGE IN MODERATE TO STRENUOUS EXERCISE (LIKE A BRISK WALK)?: 3 DAYS

## 2024-03-18 ASSESSMENT — PAIN SCALES - GENERAL: PAINLEVEL: NO PAIN (0)

## 2024-03-18 ASSESSMENT — SOCIAL DETERMINANTS OF HEALTH (SDOH): HOW OFTEN DO YOU GET TOGETHER WITH FRIENDS OR RELATIVES?: ONCE A WEEK

## 2024-03-18 NOTE — PROGRESS NOTES
"Preventive Care Visit  [facility]  Paramjit Martinez PA-C, Physician Assistant - Medical  Mar 18, 2024        Assessment & Plan     Routine general medical examination at a health care facility  Completed   - Lipid panel reflex to direct LDL Fasting; Future  - Basic metabolic panel  (Ca, Cl, CO2, Creat, Gluc, K, Na, BUN); Future  Continue working on diet, exercise.    Fear of flying  And claustrophobia   - diazepam (VALIUM) 5 MG tablet; Take 1 tablet (5 mg) by mouth every 12 hours as needed for anxiety    Screen for colon cancer  Ordered   - SHANAE[employer]); Future    Patient has been advised of split billing requirements and indicates understanding: Yes          BMI  Estimated body mass index is 31.01 kg/m  as calculated from the following:    Height as of this encounter: 1.753 m (5' 9\").    Weight as of this encounter: 95.3 kg (210 lb).   Weight management plan: Discussed healthy diet and exercise guidelines    Counseling  Appropriate preventive services were discussed with this patient, including applicable screening as appropriate for fall prevention, nutrition, physical activity, Tobacco-use cessation, weight loss and cognition.  Checklist reviewing preventive services available has been given to the patient.  Reviewed patient's diet, addressing concerns and/or questions.   He is at risk for lack of exercise and has been provided with information to increase physical activity for the benefit of his well-being.       Follow up yearly for prevent visits       Xochitl   Matilda is a 48 year old, presenting for the following:  Physical and Anxiety    Diet low in fruits, veggies, healthy choices.   Contractor for work, active usually. Focus on some cardio exercises       3/18/2024     2:37 PM   Additional Questions   Roomed by María Daniel MA   Accompanied by Self        Health Care Directive  Patient does not have a Health Care Directive or Living Will: Discussed advance care planning " with patient; however, patient declined at this time.    HPI        3/18/2024   General Health   How would you rate your overall physical health? Good   Feel stress (tense, anxious, or unable to sleep) Rather much   (!) STRESS CONCERN      3/18/2024   Nutrition   Three or more servings of calcium each day? Yes   Diet: Regular (no restrictions)   How many servings of fruit and vegetables per day? (!) 0-1   How many sweetened beverages each day? 0-1         3/18/2024   Exercise   Days per week of moderate/strenous exercise 3 days         3/18/2024   Social Factors   Frequency of gathering with friends or relatives Once a week   Worry food won't last until get money to buy more No   Food not last or not have enough money for food? No   Do you have housing?  Yes   Are you worried about losing your housing? No   Lack of transportation? No   Unable to get utilities (heat,electricity)? No         3/18/2024   Dental   Dentist two times every year? Yes         3/18/2024   TB Screening   Were you born outside of the [place]? No         Today's PHQ-2 Score:       3/18/2024     2:41 PM   PHQ-2 ( 1999 [employer])   Q1: Little interest or pleasure in doing things 1   Q2: Feeling down, depressed or hopeless 0   PHQ-2 Score 1   Q1: Little interest or pleasure in doing things Several days   Q2: Feeling down, depressed or hopeless Not at all   PHQ-2 Score 1           3/18/2024   Substance Use   Alcohol more than 3/day or more than 7/wk No   Do you use any other substances recreationally? No     Social History     Tobacco Use    Smoking status: Never    Smokeless tobacco: Never   Vaping Use    Vaping Use: Never used   Substance Use Topics    Alcohol use: Yes     Comment: 4-5 drinks per wk    Drug use: No             3/18/2024   STI Screening   New sexual partner(s) since last STI/HIV test? (!) YES    ASCVD Risk   The ASCVD Risk score (Jose BERG, et al., 2019) failed to calculate for the following reasons:    Cannot find a previous HDL  "lab    Cannot find a previous total cholesterol lab         Reviewed and updated as needed this visit by Provider   Tobacco  Allergies  Meds  Problems  Med Hx  Surg Hx  Fam Hx            Past Medical History:   Diagnosis Date    Antiplatelet or antithrombotic long-term use     Arrhythmia     paroxysmal atrial fib    Hypertension goal BP (blood pressure) < 140/90     Pectoralis muscle rupture 7/2014    left     Past Surgical History:   Procedure Laterality Date    APPENDECTOMY OPEN  2004    EP ABLATION FOCAL AFIB N/A 6/19/2019    Procedure: EP ABLATION FOCAL AFIB;  Surgeon: Ju Dewitt MD;  Location:  [facility]    REMOVE FOREIGN BODY FINGER Left 7/12/2019    Procedure: REMOVAL, FOREIGN BODY, LEFT 3RD FINGER;  Surgeon: Jay Landry MD;  Location:  OR    surgery      cheek bone surgery     Lab work is in process  Labs reviewed in EPIC      Review of Systems  Constitutional, neuro, ENT, endocrine, pulmonary, cardiac, gastrointestinal, genitourinary, musculoskeletal, integument and psychiatric systems are negative, except as otherwise noted.         Objective    Exam  /87   Pulse 80   Temp 97.8  F (36.6  C) (Tympanic)   Resp 16   Ht 1.753 m (5' 9\")   Wt 95.3 kg (210 lb)   SpO2 99%   BMI 31.01 kg/m     Estimated body mass index is 31.01 kg/m  as calculated from the following:    Height as of this encounter: 1.753 m (5' 9\").    Weight as of this encounter: 95.3 kg (210 lb).      Physical Exam  GENERAL: alert and no distress  EYES: Eyes grossly normal to inspection, PERRL and conjunctivae and sclerae normal  HENT: ear canals and TM's normal, nose and mouth without ulcers or lesions  NECK: no adenopathy, no asymmetry, masses, or scars  RESP: lungs clear to auscultation - no rales, rhonchi or wheezes  CV: regular rate and rhythm, normal S1 S2, no S3 or S4, no murmur, click or rub, no peripheral edema  ABDOMEN: soft, nontender, no hepatosplenomegaly, no masses and bowel sounds " normal  MS: no gross musculoskeletal defects noted, no edema  SKIN: no suspicious lesions or rashes  PSYCH: mentation appears normal, affect normal/bright        Signed Electronically by: Paramjit Martinez PA-C

## 2024-03-30 LAB — NONINV COLON CA DNA+OCC BLD SCRN STL QL: NEGATIVE

## 2025-01-08 ASSESSMENT — ANXIETY QUESTIONNAIRES
GAD7 TOTAL SCORE: 11
2. NOT BEING ABLE TO STOP OR CONTROL WORRYING: MORE THAN HALF THE DAYS
8. IF YOU CHECKED OFF ANY PROBLEMS, HOW DIFFICULT HAVE THESE MADE IT FOR YOU TO DO YOUR WORK, TAKE CARE OF THINGS AT HOME, OR GET ALONG WITH OTHER PEOPLE?: SOMEWHAT DIFFICULT
1. FEELING NERVOUS, ANXIOUS, OR ON EDGE: MORE THAN HALF THE DAYS
4. TROUBLE RELAXING: MORE THAN HALF THE DAYS
6. BECOMING EASILY ANNOYED OR IRRITABLE: SEVERAL DAYS
3. WORRYING TOO MUCH ABOUT DIFFERENT THINGS: MORE THAN HALF THE DAYS
GAD7 TOTAL SCORE: 11
5. BEING SO RESTLESS THAT IT IS HARD TO SIT STILL: SEVERAL DAYS
7. FEELING AFRAID AS IF SOMETHING AWFUL MIGHT HAPPEN: SEVERAL DAYS
IF YOU CHECKED OFF ANY PROBLEMS ON THIS QUESTIONNAIRE, HOW DIFFICULT HAVE THESE PROBLEMS MADE IT FOR YOU TO DO YOUR WORK, TAKE CARE OF THINGS AT HOME, OR GET ALONG WITH OTHER PEOPLE: SOMEWHAT DIFFICULT

## 2025-01-08 NOTE — PROGRESS NOTES
"Matilda is a 49 year old who is being evaluated via a billable video visit.    How would you like to obtain your AVS? MyChart  If the video visit is dropped, the invitation should be resent by: Text to cell phone: [phone]  Will anyone else be joining your video visit? No      Assessment & Plan     Fear of flying  Ongoing   - ALPRAZolam (XANAX) 0.5 MG tablet; Take 1 tablet (0.5 mg) by mouth as needed (flight anxiety/claustrophobia). Take 30 minutes before needed. May repeat once if necessary    Patient with history of s/p ablation PAF without concerns over 5 years with consistent concerns of fear of flying. Did use valium in the past that I filled in March 2024. This last time the meds were not working quite as well. I will try xanax. We discussed parameters to use, may repeat if necessary.   expected course of disease discussed with patient.  Side effects of medications reviewed    The longitudinal plan of care for the diagnosis(es)/condition(s) as documented were addressed during this visit. Due to the added complexity in care, I will continue to support Matilda in the subsequent management and with ongoing continuity of care.       BMI  Estimated body mass index is 31.01 kg/m  as calculated from the following:    Height as of 3/18/24: 1.753 m (5' 9\").    Weight as of 3/18/24: 95.3 kg (210 lb).         Follow up if not improving or if refills needed. Visits q 6-12 months     Subjective   Matilda is a 49 year old, presenting for the following health issues:  Anxiety        1/9/2025     6:40 AM   Additional Questions   Roomed by María Daniel MA   Accompanied by Self     History of Present Illness       Mental Health Follow-up:  Patient presents to follow-up on Anxiety.    Patient's anxiety since last visit has been:  Worse  The patient is having other symptoms associated with anxiety.  Any significant life events: No  Patient is feeling anxious or having panic attacks.  Patient has no concerns about alcohol or drug use. "   He is taking medications regularly.                   Objective           Vitals:  No vitals were obtained today due to virtual visit.    Physical Exam   GENERAL: alert and no distress  EYES: Eyes grossly normal to inspection.  No discharge or erythema, or obvious scleral/conjunctival abnormalities.  RESP: No audible wheeze, cough, or visible cyanosis.    SKIN: Visible skin clear. No significant rash, abnormal pigmentation or lesions.  PSYCH: Appropriate affect, tone, and pace of words          Video-Visit Details    Type of service:  Video Visit   Originating Location (pt. Location): Home    Distant Location (provider location):  On-site  Platform used for Video Visit: Santy  Signed Electronically by: Paramjit Martinez PA-C

## 2025-01-09 ENCOUNTER — VIRTUAL VISIT (OUTPATIENT)
Dept: FAMILY MEDICINE | Facility: CLINIC | Age: 50
End: 2025-01-09
Payer: COMMERCIAL

## 2025-01-09 DIAGNOSIS — F40.243 FEAR OF FLYING: ICD-10-CM

## 2025-01-09 PROBLEM — I48.0 PAROXYSMAL ATRIAL FIBRILLATION (H): Status: RESOLVED | Noted: 2019-04-25 | Resolved: 2025-01-09

## 2025-01-09 RX ORDER — ALPRAZOLAM 0.5 MG
0.5 TABLET ORAL PRN
Qty: 15 TABLET | Refills: 0 | Status: SHIPPED | OUTPATIENT
Start: 2025-01-09

## 2025-04-20 ENCOUNTER — HEALTH MAINTENANCE LETTER (OUTPATIENT)
Age: 50
End: 2025-04-20

## (undated) DEVICE — PAD DEFIBRILLATOR ELECTRODE 4.25INX6IN ADULT 2001M-C

## (undated) DEVICE — GLOVE PROTEXIS POWDER FREE 8.5 ORTHOPEDIC 2D73ET85

## (undated) DEVICE — BNDG KLING 2" 2231

## (undated) DEVICE — INTRODUCER SHEATH FAST-CATH CATH-LOCK 6FRX12CM 406701

## (undated) DEVICE — CATH MAPPING 7FR D-CURVE PENTARAY NAV D128211

## (undated) DEVICE — INTRO SHEATH MICRO PLATINUM TIP 4FRX40CM 7274

## (undated) DEVICE — PATCH CARTO 3 EXTERNAL REFERENCE 3D MAPPING CREFP6

## (undated) DEVICE — CATH EP EP-XT 6FRX125CM 2-5-2 DECAPOLAR 201007

## (undated) DEVICE — INTRODUCER SHEATH FAST-CATH SWARTZ 8.5FRX63CM SL1 CVD 406849

## (undated) DEVICE — ESU GROUND PAD ADULT W/CORD E7507

## (undated) DEVICE — TUBE SET SMARKABLATE IRRIGATION

## (undated) DEVICE — NDL 19GA 1.5"

## (undated) DEVICE — SU ETHILON 5-0 P-3 18" BLACK 698G

## (undated) DEVICE — PACK HEART LEFT CUSTOM

## (undated) DEVICE — BNDG SURGITUBE 1" TUBE GAUZE LF GL-220

## (undated) DEVICE — INTRO SHEATH 4FRX10CM PINNACLE RSS402

## (undated) DEVICE — CATH THERMOCOOL SMARTTOUCH SF DF CURVE

## (undated) DEVICE — GLOVE PROTEXIS W/NEU-THERA 8.0  2D73TE80

## (undated) DEVICE — NDL TRANSSEPTAL BRK XS 18GA 71CM BRK-1 CVD G407209

## (undated) DEVICE — PACK HAND WRIST SOP15HWFSP

## (undated) DEVICE — INTRO CATH 12CM 8.5FR FST-CATH

## (undated) DEVICE — BNDG ELASTIC 2"X5YDS STERILE 6611-2S

## (undated) DEVICE — GLOVE PROTEXIS W/NEU-THERA 7.5  2D73TE75

## (undated) DEVICE — KIT VENOUS FLUSH 60210177

## (undated) DEVICE — INTRO SHEATH 9FRX10CM PINNACLE RSS902

## (undated) DEVICE — SOL WATER IRRIG 1000ML BOTTLE 07139-09

## (undated) DEVICE — CATH SOUNDSTAR 8FRX90CM 10439011

## (undated) DEVICE — GLOVE PROTEXIS W/NEU-THERA 8.5  2D73TE85

## (undated) DEVICE — PREP CHLORAPREP 26ML TINTED ORANGE  260815

## (undated) DEVICE — CATH EP SUPREME 6FRX120CM 5MM CRD-2 CURVE 402004

## (undated) DEVICE — ESU ELEC NDL 1" COATED/INSULATED E1465

## (undated) DEVICE — INTRODUCER SHEATH FAST-CATH CATH-LOCK 7FRX12CM 406702

## (undated) DEVICE — CATH EP MAP STR OD8FR 125CM 25

## (undated) RX ORDER — ACETAMINOPHEN 325 MG/1
TABLET ORAL
Status: DISPENSED
Start: 2019-07-12

## (undated) RX ORDER — ACETAMINOPHEN 325 MG/1
TABLET ORAL
Status: DISPENSED
Start: 2019-06-19

## (undated) RX ORDER — PROTAMINE SULFATE 10 MG/ML
INJECTION, SOLUTION INTRAVENOUS
Status: DISPENSED
Start: 2019-06-19

## (undated) RX ORDER — FENTANYL CITRATE 50 UG/ML
INJECTION, SOLUTION INTRAMUSCULAR; INTRAVENOUS
Status: DISPENSED
Start: 2019-06-19

## (undated) RX ORDER — LIDOCAINE HYDROCHLORIDE 20 MG/ML
SOLUTION OROPHARYNGEAL
Status: DISPENSED
Start: 2019-06-18

## (undated) RX ORDER — FENTANYL CITRATE 50 UG/ML
INJECTION, SOLUTION INTRAMUSCULAR; INTRAVENOUS
Status: DISPENSED
Start: 2019-06-18

## (undated) RX ORDER — LIDOCAINE HYDROCHLORIDE 10 MG/ML
INJECTION, SOLUTION EPIDURAL; INFILTRATION; INTRACAUDAL; PERINEURAL
Status: DISPENSED
Start: 2019-06-19

## (undated) RX ORDER — HEPARIN SODIUM 1000 [USP'U]/ML
INJECTION, SOLUTION INTRAVENOUS; SUBCUTANEOUS
Status: DISPENSED
Start: 2019-06-19